# Patient Record
Sex: MALE | Race: WHITE | Employment: OTHER | URBAN - METROPOLITAN AREA
[De-identification: names, ages, dates, MRNs, and addresses within clinical notes are randomized per-mention and may not be internally consistent; named-entity substitution may affect disease eponyms.]

---

## 2021-03-30 ENCOUNTER — ANCILLARY PROCEDURE (OUTPATIENT)
Dept: GENERAL RADIOLOGY | Facility: CLINIC | Age: 81
End: 2021-03-30
Attending: PHYSICAL MEDICINE & REHABILITATION
Payer: MEDICARE

## 2021-03-30 ENCOUNTER — OFFICE VISIT (OUTPATIENT)
Dept: ORTHOPEDICS | Facility: CLINIC | Age: 81
End: 2021-03-30
Payer: MEDICARE

## 2021-03-30 VITALS — WEIGHT: 163 LBS | DIASTOLIC BLOOD PRESSURE: 80 MMHG | SYSTOLIC BLOOD PRESSURE: 128 MMHG

## 2021-03-30 DIAGNOSIS — M54.50 LOWER BACK PAIN: ICD-10-CM

## 2021-03-30 DIAGNOSIS — M25.551 RIGHT HIP PAIN: ICD-10-CM

## 2021-03-30 DIAGNOSIS — M54.41 ACUTE RIGHT-SIDED LOW BACK PAIN WITH RIGHT-SIDED SCIATICA: Primary | ICD-10-CM

## 2021-03-30 PROCEDURE — 99204 OFFICE O/P NEW MOD 45 MIN: CPT | Performed by: PHYSICAL MEDICINE & REHABILITATION

## 2021-03-30 PROCEDURE — 72100 X-RAY EXAM L-S SPINE 2/3 VWS: CPT | Mod: TC | Performed by: RADIOLOGY

## 2021-03-30 PROCEDURE — 73502 X-RAY EXAM HIP UNI 2-3 VIEWS: CPT | Mod: TC | Performed by: RADIOLOGY

## 2021-03-30 RX ORDER — METHYLPREDNISOLONE 4 MG
TABLET, DOSE PACK ORAL
Qty: 21 TABLET | Refills: 0 | Status: SHIPPED | OUTPATIENT
Start: 2021-03-30 | End: 2021-04-23

## 2021-03-30 NOTE — PROGRESS NOTES
"Sports Medicine Clinic Visit    PCP: No primary care provider on file.    CC: Patient presents with:  Lower Back - Pain      HPI:  Robby Yanez is a 80 year old male who is seen as a self referral.   He notes right lower back pain that travels into the hip, back of thigh and down back of lower leg.  Started 2-3 weeks ago, but this morning when he woke up it was much worse.  He rates the pain at a 10/10 at its worst and a 3/10 currently if sits in a certain position. Pain is described as a burning pain.  Symptoms are minimally relieved with Tylenol. Symptoms are worsened by walking and sit to stand transition.  He denies popping, numbness and tingling.  Other treatment has included Tylenol. About 2-3 years ago in [place], he had lower back surgery and patient notes that they had \"cleaned the bones up.\"  Bowls without difficulty and does PT exercises regularly.      He is retired.    History reviewed. No pertinent past surgical/medical/family/social history other than as mentioned in HPI.  Review of systems negative except per HPI.      Patient Active Problem List   Diagnosis   (none) - all problems resolved or deleted       Social History     Socioeconomic History     Marital status: Single     Spouse name: Not on file     Number of children: Not on file     Years of education: Not on file     Highest education level: Not on file   Occupational History     Not on file   Social Needs     Financial resource strain: Not on file     Food insecurity     Worry: Not on file     Inability: Not on file     Transportation needs     Medical: Not on file     Non-medical: Not on file   Tobacco Use     Smoking status: Not on file   Substance and Sexual Activity     Alcohol use: Not on file     Drug use: Not on file     Sexual activity: Not on file   Lifestyle     Physical activity     Days per week: Not on file     Minutes per session: Not on file     Stress: Not on file   Relationships     Social connections     Talks on " phone: Not on file     Gets together: Not on file     Attends Congregational service: Not on file     Active member of club or organization: Not on file     Attends meetings of clubs or organizations: Not on file     Relationship status: Not on file     Intimate partner violence     Fear of current or ex partner: Not on file     Emotionally abused: Not on file     Physically abused: Not on file     Forced sexual activity: Not on file   Other Topics Concern     Not on file   Social History Narrative     Not on file         Current Outpatient Medications   Medication     methylPREDNISolone (MEDROL DOSEPAK) 4 MG tablet therapy pack     No current facility-administered medications for this visit.      Allergies   Allergen Reactions     Penicillins          Objective:  /80 (BP Location: Right arm, Patient Position: Sitting, Cuff Size: Adult Regular)   Wt 73.9 kg (163 lb)     General: Alert and in no distress    Head: Normocephalic, atraumatic  Eyes: no scleral icterus or conjunctival erythema   Skin: no erythema, petechiae, or jaundice  CV: regular rhythm by palpation, 2+ distal pulses  Resp: normal respiratory effort without conversational dyspnea   Psych: normal mood and affect    Gait: Antalgic  Musculoskeletal:  -Lumbar surgical scar, well healed  -Tenderness to palpation over the right gluteal muscles  -Decreased and painful lumbar flexion, extension, and right lateral flexion  -Sensation intact to light touch over the lower extremities    Radiology:  X-rays ordered and independent visualization of images performed and reviewed with Robby.    Recent Results (from the past 744 hour(s))   XR Lumbar Spine 2-3 Views*    Narrative    LUMBAR SPINE TWO TO THREE VIEWS  3/30/2021 10:38 AM     HISTORY: Lower back pain.    COMPARISON: None.    FINDINGS:  There are five non-rib bearing lumbar type vertebrae. There  is broad-based dextroconvex curvature of the lumbar spine centered at  L3. Disc height loss is seen throughout  the lumbar spine but is worst  from L2 through L5. This is asymmetrically worse on the left at L2-L3  and at L3-L4 and is symmetric and L4-L5. Spondylotic spurs are seen,  worse on the left and in the anterior aspects of the lumbar spine. No  acute fracture or malalignment is otherwise seen. Pedicles are intact.  Psoas margins are not well seen on this study. SI joints are  unremarkable. Visualized portions of the superior hips demonstrate  normal superior hip joint space. There is a nonspecific bowel gas  pattern with gas seen both in small and large bowel loops. There is  straightening of the normal lumbar lordosis.      Impression    IMPRESSION:    1. Multilevel degenerative disc and facet disease of the lumbar spine  from L2 through S1.  2. Broad-based dextroconvex curvature lumbar spine centered at L2-L3  could be partially degenerative in etiology.  3. No acute fracture or malalignment.  4. Mildly nonspecific bowel gas pattern as described above.  5. Further evaluation with MRI lumbar spine may be helpful, as  clinically indicated.   XR Pelvis w Hip RT 1 View    Narrative    XR PELVIS AND HIP RIGHT 1 VIEW 3/30/2021 10:38 AM     HISTORY: Right hip pain      Impression    IMPRESSION: Mild femoral head osteophytic spurring. Hip joint space  width is relatively well preserved.    SHERLEY MCKEON MD         Assessment:  1. Acute right-sided low back pain with right-sided sciatica        Plan:  Discussed the assessment with the patient and developed a plan together:  -Medrol dose pack prescribed.  -Patient's preferred over the counter medication as directed on packaging as needed for pain or soreness.  -Ice or heat 15-20 minutes as needed (Avoid sleeping on a heating pad or ice)  -Consider use of assistive device (walker, cane) for safety with ambulation.    -Continue home exercises.    -Also discussed MRI and epidural steroid injections    **Advised that if he develops worsening pain, loss of bowel or bladder  control, loss of sensation in the extremities, or weakness in the extremities, he should seek emergent medical treatment**    -Follow up as needed if symptoms fail to improve or worsen.  Please call with questions or concerns.      Anny Archer MD, CAQ Sports Medicine  [facility]

## 2021-03-30 NOTE — PATIENT INSTRUCTIONS
-Medrol dose pack prescribed.  -Patient's preferred over the counter medication as directed on packaging as needed for pain or soreness.  -Ice or heat 15-20 minutes as needed (Avoid sleeping on a heating pad or ice)  -Consider use of assistive device (walker, cane) for safety with ambulation.    -Continue home exercises.    -Also discussed MRI and epidural steroid injections    **If you develop worsening pain, loss of bowel or bladder control, loss of sensation in the extremities, or weakness in the extremities, please seek emergent medical treatment**    -Follow up as needed if symptoms fail to improve or worsen.  Please call with questions or concerns.

## 2021-03-30 NOTE — Clinical Note
"    3/30/2021         RE: Robby Yanez  [address] [address]        Dear Colleague,    Thank you for referring your patient, Robby Yanez, to the [facility] [place]. Please see a copy of my visit note below.    Sports Medicine Clinic Visit    PCP: No primary care provider on file.    CC: Patient presents with:  Lower Back - Pain      HPI:  Robby Yanez is a 80 year old male who is seen as a self referral.   He notes right lower back pain that travels into the hip, back of thigh and down back of lower leg.  Started 2-3 weeks ago, but this morning when he woke up it was much worse.  He rates the pain at a 10/10 at its worst and a 3/10 currently if sits in a certain position. Pain is described as a burning pain.  Symptoms are minimally relieved with Tylenol. Symptoms are worsened by walking and sit to stand transition.  He denies popping, numbness and tingling.  Other treatment has included Tylenol. About 2-3 years ago in [place], he had lower back surgery and patient notes that they had \"cleaned the bones up.\"  Bowls without difficulty and does PT exercises regularly.      He is retired.    History reviewed. No pertinent past surgical/medical/family/social history other than as mentioned in HPI.  Review of systems negative except per HPI.      Patient Active Problem List   Diagnosis   (none) - all problems resolved or deleted       Social History     Socioeconomic History     Marital status: Single     Spouse name: Not on file     Number of children: Not on file     Years of education: Not on file     Highest education level: Not on file   Occupational History     Not on file   Social Needs     Financial resource strain: Not on file     Food insecurity     Worry: Not on file     Inability: Not on file     Transportation needs     Medical: Not on file     Non-medical: Not on file   Tobacco Use     Smoking status: Not on file   Substance and Sexual " Activity     Alcohol use: Not on file     Drug use: Not on file     Sexual activity: Not on file   Lifestyle     Physical activity     Days per week: Not on file     Minutes per session: Not on file     Stress: Not on file   Relationships     Social connections     Talks on phone: Not on file     Gets together: Not on file     Attends Christian service: Not on file     Active member of club or organization: Not on file     Attends meetings of clubs or organizations: Not on file     Relationship status: Not on file     Intimate partner violence     Fear of current or ex partner: Not on file     Emotionally abused: Not on file     Physically abused: Not on file     Forced sexual activity: Not on file   Other Topics Concern     Not on file   Social History Narrative     Not on file         Current Outpatient Medications   Medication     methylPREDNISolone (MEDROL DOSEPAK) 4 MG tablet therapy pack     No current facility-administered medications for this visit.      Allergies   Allergen Reactions     Penicillins          Objective:  /80 (BP Location: Right arm, Patient Position: Sitting, Cuff Size: Adult Regular)   Wt 73.9 kg (163 lb)     General: Alert and in no distress    Head: Normocephalic, atraumatic  Eyes: no scleral icterus or conjunctival erythema   Skin: no erythema, petechiae, or jaundice  CV: regular rhythm by palpation, 2+ distal pulses  Resp: normal respiratory effort without conversational dyspnea   Psych: normal mood and affect    Gait: Antalgic  Musculoskeletal:  -Lumbar surgical scar, well healed  -Tenderness to palpation over the right gluteal muscles  -Decreased and painful lumbar flexion, extension, and right lateral flexion  -Sensation intact to light touch over the lower extremities    Radiology:  X-rays ordered and independent visualization of images performed and reviewed with Robby.    Recent Results (from the past 744 hour(s))   XR Lumbar Spine 2-3 Views*    Narrative    LUMBAR SPINE  TWO TO THREE VIEWS  3/30/2021 10:38 AM     HISTORY: Lower back pain.    COMPARISON: None.    FINDINGS:  There are five non-rib bearing lumbar type vertebrae. There  is broad-based dextroconvex curvature of the lumbar spine centered at  L3. Disc height loss is seen throughout the lumbar spine but is worst  from L2 through L5. This is asymmetrically worse on the left at L2-L3  and at L3-L4 and is symmetric and L4-L5. Spondylotic spurs are seen,  worse on the left and in the anterior aspects of the lumbar spine. No  acute fracture or malalignment is otherwise seen. Pedicles are intact.  Psoas margins are not well seen on this study. SI joints are  unremarkable. Visualized portions of the superior hips demonstrate  normal superior hip joint space. There is a nonspecific bowel gas  pattern with gas seen both in small and large bowel loops. There is  straightening of the normal lumbar lordosis.      Impression    IMPRESSION:    1. Multilevel degenerative disc and facet disease of the lumbar spine  from L2 through S1.  2. Broad-based dextroconvex curvature lumbar spine centered at L2-L3  could be partially degenerative in etiology.  3. No acute fracture or malalignment.  4. Mildly nonspecific bowel gas pattern as described above.  5. Further evaluation with MRI lumbar spine may be helpful, as  clinically indicated.   XR Pelvis w Hip RT 1 View    Narrative    XR PELVIS AND HIP RIGHT 1 VIEW 3/30/2021 10:38 AM     HISTORY: Right hip pain      Impression    IMPRESSION: Mild femoral head osteophytic spurring. Hip joint space  width is relatively well preserved.    SHERLEY MCKEON MD         Assessment:  1. Acute right-sided low back pain with right-sided sciatica        Plan:  Discussed the assessment with the patient and developed a plan together:  -Medrol dose pack prescribed.  -Patient's preferred over the counter medication as directed on packaging as needed for pain or soreness.  -Ice or heat 15-20 minutes as needed (Avoid  sleeping on a heating pad or ice)  -Consider use of assistive device (walker, cane) for safety with ambulation.    -Continue home exercises.    -Also discussed MRI and epidural steroid injections    **Advised that if he develops worsening pain, loss of bowel or bladder control, loss of sensation in the extremities, or weakness in the extremities, he should seek emergent medical treatment**    -Follow up as needed if symptoms fail to improve or worsen.  Please call with questions or concerns.      Anny Archer MD, [facility] Sports Medicine  [facility]        Again, thank you for allowing me to participate in the care of your patient.        Sincerely,        Catherine Archer MD

## 2021-04-05 NOTE — PROGRESS NOTES
[facility] Medicine Clinic Visit       PCP: No Ref-Primary, Physician    Robby Yanez is a 80 year old male who is seen in follow up for right hip and lumbar back pain. Since last visit on 3/30/2021, Robby has noted that his pinching back pain has been relieved. He still notes right hip pain that radiates down the back of his leg into his ankle. He rates the pain at a 5/10 currently. Symptoms are present in all positions. Notes has been sleeping on the carpeted floor as of late. Says it is the most comfortable. Has finished his Medrol Pack.    He is retired.      History reviewed. No pertinent past surgical/medical/family/social history other than as mentioned in HPI.    Patient Active Problem List   Diagnosis   (none) - all problems resolved or deleted     No past medical history on file.  No past surgical history on file.  No family history on file.  Social History     Socioeconomic History     Marital status: Single     Spouse name: Not on file     Number of children: Not on file     Years of education: Not on file     Highest education level: Not on file   Occupational History     Not on file   Social Needs     Financial resource strain: Not on file     Food insecurity     Worry: Not on file     Inability: Not on file     Transportation needs     Medical: Not on file     Non-medical: Not on file   Tobacco Use     Smoking status: Not on file   Substance and Sexual Activity     Alcohol use: Not on file     Drug use: Not on file     Sexual activity: Not on file   Lifestyle     Physical activity     Days per week: Not on file     Minutes per session: Not on file     Stress: Not on file   Relationships     Social connections     Talks on phone: Not on file     Gets together: Not on file     Attends Pentec[employer]alism service: Not on file     Active member of club or organization: Not on file     Attends meetings of clubs or organizations: Not on file     Relationship status: Not on file     Intimate partner violence      "Fear of current or ex partner: Not on file     Emotionally abused: Not on file     Physically abused: Not on file     Forced sexual activity: Not on file   Other Topics Concern     Not on file   Social History Narrative     Not on file         Current Outpatient Medications   Medication     methylPREDNISolone (MEDROL DOSEPAK) 4 MG tablet therapy pack     No current facility-administered medications for this visit.      Allergies   Allergen Reactions     Penicillins          Objective:  /72 (BP Location: Right arm, Patient Position: Sitting, Cuff Size: Adult Regular)   Ht 1.727 m (5' 8\")   Wt 73.9 kg (163 lb)   BMI 24.78 kg/m      General: Alert and in no distress    Head: Normocephalic, atraumatic  Eyes: no scleral icterus or conjunctival erythema   Skin: no erythema, petechiae, or jaundice  Resp: normal respiratory effort without conversational dyspnea   Psych: normal mood and affect    Gait: Cautious gait    Musculoskeletal:  -No tenderness to palpation over the low back or right leg  -Altered sensation to light touch over the right anterolateral lower leg    Radiology:    XR PELVIS AND HIP RIGHT 1 VIEW 3/30/2021 10:38 AM      HISTORY: Right hip pain                                                                      IMPRESSION: Mild femoral head osteophytic spurring. Hip joint space  width is relatively well preserved.     SHERLEY MCKEON MD     LUMBAR SPINE TWO TO THREE VIEWS  3/30/2021 10:38 AM      HISTORY: Lower back pain.     COMPARISON: None.     FINDINGS:  There are five non-rib bearing lumbar type vertebrae. There  is broad-based dextroconvex curvature of the lumbar spine centered at  L3. Disc height loss is seen throughout the lumbar spine but is worst  from L2 through L5. This is asymmetrically worse on the left at L2-L3  and at L3-L4 and is symmetric and L4-L5. Spondylotic spurs are seen,  worse on the left and in the anterior aspects of the lumbar spine. No  acute fracture or malalignment is " otherwise seen. Pedicles are intact.  Psoas margins are not well seen on this study. SI joints are  unremarkable. Visualized portions of the superior hips demonstrate  normal superior hip joint space. There is a nonspecific bowel gas  pattern with gas seen both in small and large bowel loops. There is  straightening of the normal lumbar lordosis.                                                                      IMPRESSION:    1. Multilevel degenerative disc and facet disease of the lumbar spine  from L2 through S1.  2. Broad-based dextroconvex curvature lumbar spine centered at L2-L3  could be partially degenerative in etiology.  3. No acute fracture or malalignment.  4. Mildly nonspecific bowel gas pattern as described above.  5. Further evaluation with MRI lumbar spine may be helpful, as  clinically indicated.     JOSÉ MIGUEL MCKINLEY MD    Assessment:  1. Acute right-sided low back pain with right-sided sciatica        Plan:  Discussed the assessment with the patient and developed a plan together:  -MRI lumbar spine scheduled for 2:15 pm 4/7/2021 at [facility].    -Continue home exercises.    -Ice or heat 15-20 minutes as needed (Avoid sleeping on a heating pad or ice).  -Patient's preferred over the counter medication as directed on packaging as needed for pain or soreness.    -Also discussed lumbar epidural steroid injection    -Follow up with Dr. Archer on 4/8/2021 at 11:00 am at [facility].  Please call with questions or concerns.        Anny Archer MD, [facility] Sports Medicine  [facility]

## 2021-04-06 ENCOUNTER — OFFICE VISIT (OUTPATIENT)
Dept: ORTHOPEDICS | Facility: CLINIC | Age: 81
End: 2021-04-06
Payer: MEDICARE

## 2021-04-06 VITALS
DIASTOLIC BLOOD PRESSURE: 72 MMHG | WEIGHT: 163 LBS | HEIGHT: 68 IN | BODY MASS INDEX: 24.71 KG/M2 | SYSTOLIC BLOOD PRESSURE: 106 MMHG

## 2021-04-06 DIAGNOSIS — M54.41 ACUTE RIGHT-SIDED LOW BACK PAIN WITH RIGHT-SIDED SCIATICA: Primary | ICD-10-CM

## 2021-04-06 PROCEDURE — 99214 OFFICE O/P EST MOD 30 MIN: CPT | Performed by: PHYSICAL MEDICINE & REHABILITATION

## 2021-04-06 ASSESSMENT — MIFFLIN-ST. JEOR: SCORE: 1413.86

## 2021-04-06 NOTE — LETTER
Haris Sweeney   1/13/2017 10:30 AM   Office Visit    Dept Phone:  [phone]   Encounter #:  [id number]    Provider:  EVELINE Lazcano   Department:  [facility] INTERNAL MEDICINE                Your Full Care Plan              Today's Medication Changes          These changes are accurate as of: 1/13/17 11:12 AM.  If you have any questions, ask your nurse or doctor.               New Medication(s)Ordered:     cetirizine 5 MG tablet   Commonly known as:  zyrTEC   Take 1 tablet by mouth Daily.       Dextromethorphan-Guaifenesin  MG capsule   Take  mg by mouth Every 4 (Four) Hours As Needed (congestion and cough).       fluticasone 50 MCG/ACT nasal spray   Commonly known as:  FLONASE   2 sprays into each nostril Daily for 30 days.         Stop taking medication(s)listed here:     fluticasone 27.5 MCG/SPRAY nasal spray   Commonly known as:  VERAMYST                Where to Get Your Medications      These medications were sent to [facility]/pharmacy #[id number] - [place], [place] - [address]  [facility] [phone] [facility] [phone]   [address], [place] [address]    Hours:  24-hours Phone:  [phone]     cetirizine 5 MG tablet    Dextromethorphan-Guaifenesin  MG capsule    fluticasone 50 MCG/ACT nasal spray                  Your Updated Medication List          This list is accurate as of: 1/13/17 11:12 AM.  Always use your most recent med list.                allopurinol 100 MG tablet   Commonly known as:  ZYLOPRIM   Take 1 tablet by mouth 2 (two) times a day.       carvedilol 6.25 MG tablet   Commonly known as:  COREG       cetirizine 5 MG tablet   Commonly known as:  zyrTEC   Take 1 tablet by mouth Daily.       citalopram 10 MG tablet   Commonly known as:  CeleXA   Take 1 tablet by mouth daily. TAKE 1 TABLET BY MOUTH DAILY       Dextromethorphan-Guaifenesin  MG capsule   Take  mg by mouth Every 4 (Four) Hours As Needed      4/6/2021         RE: Robby Yanez  [address] [facility] [address]        Dear Colleague,    Thank you for referring your patient, Robby Yanez, to the [facility] [place]. Please see a copy of my visit note below.    [facility] Visit       PCP: No Ref-Primary, Physician    Robby Yanez is a 80 year old male who is seen in follow up for right hip and lumbar back pain. Since last visit on 3/30/2021, Robby has noted that his pinching back pain has been relieved. He still notes right hip pain that radiates down the back of his leg into his ankle. He rates the pain at a 5/10 currently. Symptoms are present in all positions. Notes has been sleeping on the carpeted floor as of late. Says it is the most comfortable. Has finished his Medrol Pack.    He is retired.      History reviewed. No pertinent past surgical/medical/family/social history other than as mentioned in HPI.    Patient Active Problem List   Diagnosis   (none) - all problems resolved or deleted     No past medical history on file.  No past surgical history on file.  No family history on file.  Social History     Socioeconomic History     Marital status: Single     Spouse name: Not on file     Number of children: Not on file     Years of education: Not on file     Highest education level: Not on file   Occupational History     Not on file   Social Needs     Financial resource strain: Not on file     Food insecurity     Worry: Not on file     Inability: Not on file     Transportation needs     Medical: Not on file     Non-medical: Not on file   Tobacco Use     Smoking status: Not on file   Substance and Sexual Activity     Alcohol use: Not on file     Drug use: Not on file     Sexual activity: Not on file   Lifestyle     Physical activity     Days per week: Not on file     Minutes per session: Not on file     Stress: Not on file   Relationships     Social connections     Talks on phone: Not  "on file     Gets together: Not on file     Attends Latter day service: Not on file     Active member of club or organization: Not on file     Attends meetings of clubs or organizations: Not on file     Relationship status: Not on file     Intimate partner violence     Fear of current or ex partner: Not on file     Emotionally abused: Not on file     Physically abused: Not on file     Forced sexual activity: Not on file   Other Topics Concern     Not on file   Social History Narrative     Not on file         Current Outpatient Medications   Medication     methylPREDNISolone (MEDROL DOSEPAK) 4 MG tablet therapy pack     No current facility-administered medications for this visit.      Allergies   Allergen Reactions     Penicillins          Objective:  /72 (BP Location: Right arm, Patient Position: Sitting, Cuff Size: Adult Regular)   Ht 1.727 m (5' 8\")   Wt 73.9 kg (163 lb)   BMI 24.78 kg/m      General: Alert and in no distress    Head: Normocephalic, atraumatic  Eyes: no scleral icterus or conjunctival erythema   Skin: no erythema, petechiae, or jaundice  Resp: normal respiratory effort without conversational dyspnea   Psych: normal mood and affect    Gait: Cautious gait    Musculoskeletal:  -No tenderness to palpation over the low back or right leg  -Altered sensation to light touch over the right anterolateral lower leg    Radiology:    XR PELVIS AND HIP RIGHT 1 VIEW 3/30/2021 10:38 AM      HISTORY: Right hip pain                                                                      IMPRESSION: Mild femoral head osteophytic spurring. Hip joint space  width is relatively well preserved.     SHERLEY MCKEON MD     LUMBAR SPINE TWO TO THREE VIEWS  3/30/2021 10:38 AM      HISTORY: Lower back pain.     COMPARISON: None.     FINDINGS:  There are five non-rib bearing lumbar type vertebrae. There  is broad-based dextroconvex curvature of the lumbar spine centered at  L3. Disc height loss is seen throughout the " (congestion and cough).       docusate sodium 100 MG capsule   Commonly known as:  COLACE       finasteride 5 MG tablet   Commonly known as:  PROSCAR       fluticasone 50 MCG/ACT nasal spray   Commonly known as:  FLONASE   2 sprays into each nostril Daily for 30 days.       furosemide 40 MG tablet   Commonly known as:  LASIX   TAKE 1 TABLET BY MOUTH 2 (TWO) TIMES A DAY.       glimepiride 2 MG tablet   Commonly known as:  AMARYL       glucose blood test strip   Test glucose daily DX E11.9       levothyroxine 100 MCG tablet   Commonly known as:  SYNTHROID, LEVOTHROID   Take 1 tablet by mouth daily.       metFORMIN 500 MG tablet   Commonly known as:  GLUCOPHAGE   TAKE 1 TABLET BY MOUTH TWICE A DAY       ONE TOUCH LANCETS misc   Test glucose daily dx e11.9       ONE TOUCH ULTRA 2 W/DEVICE kit   Test glucose daily       pantoprazole 40 MG EC tablet   Commonly known as:  PROTONIX       potassium chloride 10 MEQ CR tablet   Commonly known as:  K-DUR       Saw Palmetto 450 MG capsule       valsartan 40 MG tablet   Commonly known as:  DIOVAN   Take 1 tablet by mouth Daily.       VITAMIN B COMPLEX PO       warfarin 2.5 MG tablet   Commonly known as:  COUMADIN               Instructions     None    Patient Instructions History      Goals     Eat more fruits and vegetables     I have stressed to him the need for proper healthy diet low carbohydrate with more vegetables and high-fiber        Upcoming Appointments     Visit Type Date Time Department    OFFICE VISIT 1/13/2017 10:30 AM [facility] ALEXIS 1 4003    OFFICE VISIT 1/26/2017  1:45 PM [facility] TRACIJ YOLANDA    OFFICE VISIT 4/5/2017  1:30 PM [facility] CARDOZAE 1 4003      Udexhart Signup     Our records indicate that you have declined voxapphart signup. If you would like to sign up for Showpad, please email [email or web address] or call [phone] to obtain an activation code.             Other Info from Your Visit           Your Appointments     Jan 26, 2017  1:45 PM EST    Office Visit with Ashley Mcclellan MD   [facility] (--)    [address] [facility] [address]  [facility] [id number] [phone]           Arrive 15 minutes prior to appointment.            Apr 05, 2017  1:30 PM EDT   Office Visit with Srikanth Madsen MD   [facility] INTERNAL MEDICINE (--)    [address] [facility]  [facility] [address] [phone]           Arrive 15 minutes prior to appointment.              Allergies     Digoxin And Related      Keflex [Cephalexin]        Reason for Visit     URI     Cough     Fatigue           Vital Signs     Blood Pressure Pulse Temperature Respirations Weight Oxygen Saturation    118/68 (BP Location: Right arm, Patient Position: Sitting, Cuff Size: Small Adult) 76 97.4 °F (36.3 °C) (Tympanic) 16 155 lb (70.3 kg) 98%    Body Mass Index Smoking Status                26.61 kg/m2 Former Smoker             lumbar spine but is worst  from L2 through L5. This is asymmetrically worse on the left at L2-L3  and at L3-L4 and is symmetric and L4-L5. Spondylotic spurs are seen,  worse on the left and in the anterior aspects of the lumbar spine. No  acute fracture or malalignment is otherwise seen. Pedicles are intact.  Psoas margins are not well seen on this study. SI joints are  unremarkable. Visualized portions of the superior hips demonstrate  normal superior hip joint space. There is a nonspecific bowel gas  pattern with gas seen both in small and large bowel loops. There is  straightening of the normal lumbar lordosis.                                                                      IMPRESSION:    1. Multilevel degenerative disc and facet disease of the lumbar spine  from L2 through S1.  2. Broad-based dextroconvex curvature lumbar spine centered at L2-L3  could be partially degenerative in etiology.  3. No acute fracture or malalignment.  4. Mildly nonspecific bowel gas pattern as described above.  5. Further evaluation with MRI lumbar spine may be helpful, as  clinically indicated.     JOSÉ MIGUEL MCKINLEY MD    Assessment:  1. Acute right-sided low back pain with right-sided sciatica        Plan:  Discussed the assessment with the patient and developed a plan together:  -MRI lumbar spine scheduled for 2:15 pm 4/7/2021 at [facility].    -Continue home exercises.    -Ice or heat 15-20 minutes as needed (Avoid sleeping on a heating pad or ice).  -Patient's preferred over the counter medication as directed on packaging as needed for pain or soreness.    -Also discussed lumbar epidural steroid injection    -Follow up with Dr. Archer on 4/8/2021 at 11:00 am at [facility].  Please call with questions or concerns.        Anny Archer MD, [facility] Sports Medicine  [facility]          Again, thank you for allowing me to participate in the care of your patient.        Sincerely,        Catherine  PARUL Archer MD

## 2021-04-06 NOTE — PATIENT INSTRUCTIONS
-MRI lumbar spine scheduled for 2:15 pm 4/7/2021 at [facility].  Please check in on the main floor at registration.  -Continue home exercises.    -Ice or heat 15-20 minutes as needed (Avoid sleeping on a heating pad or ice).  -Patient's preferred over the counter medication as directed on packaging as needed for pain or soreness.    -Also discussed lumbar epidural steroid injection    -Follow up with Dr. Archer on 4/8/2021 at 11:00 am at [facility].  Please call with questions or concerns.

## 2021-04-06 NOTE — PROGRESS NOTES
Sports Medicine Clinic Visit       PCP: No Ref-Primary, Physician     Robby Yanez is a 80 year old male who is seen in follow up for right hip and lumbar back pain. Since last visit on 4/6/2021, Robby has noted that his pinching back pain has returned. He still notes right hip pain that radiates down the back of his leg into his ankle. He rates the pain at a 5/10 currently. Symptoms are present in all positions. Sleeping in a recliner for pain relief. Says it is the most comfortable. Took a few Tylenol last night for pain.     He is retired.       History reviewed. No pertinent past surgical/medical/family/social history other than as mentioned in HPI.    Patient Active Problem List   Diagnosis   (none) - all problems resolved or deleted       Social History     Socioeconomic History     Marital status: Single     Spouse name: Not on file     Number of children: Not on file     Years of education: Not on file     Highest education level: Not on file   Occupational History     Not on file   Social Needs     Financial resource strain: Not on file     Food insecurity     Worry: Not on file     Inability: Not on file     Transportation needs     Medical: Not on file     Non-medical: Not on file   Tobacco Use     Smoking status: Not on file   Substance and Sexual Activity     Alcohol use: Not on file     Drug use: Not on file     Sexual activity: Not on file   Lifestyle     Physical activity     Days per week: Not on file     Minutes per session: Not on file     Stress: Not on file   Relationships     Social connections     Talks on phone: Not on file     Gets together: Not on file     Attends Sikhism service: Not on file     Active member of club or organization: Not on file     Attends meetings of clubs or organizations: Not on file     Relationship status: Not on file     Intimate partner violence     Fear of current or ex partner: Not on file     Emotionally abused: Not on file     Physically abused: Not on  "file     Forced sexual activity: Not on file   Other Topics Concern     Not on file   Social History Narrative     Not on file         Current Outpatient Medications   Medication     methylPREDNISolone (MEDROL DOSEPAK) 4 MG tablet therapy pack     No current facility-administered medications for this visit.      Allergies   Allergen Reactions     Penicillins          Objective:  /78 (BP Location: Right arm, Patient Position: Sitting, Cuff Size: Adult Regular)   Ht 1.727 m (5' 8\")   Wt 73.9 kg (163 lb)   BMI 24.78 kg/m      General: Alert and in no distress    Head: Normocephalic, atraumatic  Eyes: no scleral icterus or conjunctival erythema   Skin: no erythema, petechiae, or jaundice  Resp: normal respiratory effort without conversational dyspnea   Psych: normal mood and affect    Gait: Antalgic    Radiology:  Independent visualization of images performed and reviewed with Robby.    MRI LUMBAR SPINE WITH AND WITHOUT CONTRAST   4/7/2021 3:40 PM      HISTORY: Low back pain, prior surgery, new symptoms. Acute right-sided  low back pain with right-sided sciatica.     TECHNIQUE: Multiplanar multisequence MRI of the lumbar spine with and  without contrast. A total of 7.5 mL of Gadavist was injected  intravenously.     COMPARISON: X-rays 3/30/2021      FINDINGS:  Alignment is significant for dextroconvex curvature. Bone  marrow demonstrates scattered degenerative endplate change with marrow  edema, most conspicuous at L4-L5 and L5-S1. Conus medullaris and cauda  equina are unremarkable. Conus medullaris terminates at the level of  the mid L2 vertebral body. Presumed parapelvic renal cysts and 3 mm  lesion within the medial cortex of right kidney which is incompletely  characterized, statistically likely benign cyst.     Segmental Analysis:      T12-L1:  Mild disc height loss. No herniation. Mild bilateral facet  arthropathy. No foraminal or spinal canal stenosis.      L1-L2:  Mild disc height loss. Disc bulge, " asymmetric to the right.  Mild bilateral facet arthropathy. Mild bilateral foraminal stenosis,  right worse than left. Mild spinal canal stenosis.       L2-L3:  Laminectomy change. Severe disc height loss. Disc bulge with  shallow left central protrusion component. Mild bilateral facet  arthropathy. Mild to moderate bilateral foraminal stenosis. No spinal  canal stenosis.     L3-L4:  Laminectomy change. Severe disc height loss. Disc bulge with  left central protrusion component which probably contacts the  traversing left L4 nerve root within the lateral recess. Mild  bilateral facet arthropathy. Moderate bilateral foraminal stenosis,  left worse than right. No spinal canal stenosis.     L4-L5:  Laminectomy change. Severe disc height loss. Disc bulge,  asymmetric to the left. Mild bilateral facet arthropathy. Moderate  right foraminal stenosis. Severe left foraminal stenosis. No spinal  canal stenosis.       L5-S1:  Laminectomy change. Moderate disc height loss. Disc bulge,  asymmetric to the right. Enhancement along the right aspect of the  disc within the paraspinal soft tissues, presumably degenerative.  Moderate right facet arthropathy with a 6 mm synovial cyst projecting  anteriorly off the right facet joint which contributes to lateral  recess and foraminal stenosis. Mild left facet arthropathy. Severe  right foraminal stenosis. Moderate left foraminal stenosis. No spinal  canal stenosis.                                                                      IMPRESSION:    1. Postoperative change of L2-L3 through L5-S1 laminectomies.  2. 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  3. Other degenerative change as detailed.     JONATHON BLAIR MD    Assessment:  1. Acute right-sided low back pain with right-sided sciatica    2. Lumbar degenerative disc disease    3. Right lumbar radiculopathy    4. History of spinal surgery        Plan:  Discussed the assessment with the patient and developed a plan  together:  -Lumbar epidural steroid injection ordered with pain management at [facility].   -Continue home exercises.    -Ice or heat 15-20 minutes as needed (Avoid sleeping on a heating pad or ice).  -Patient's preferred over the counter medication as directed on packaging as needed for pain or soreness.    -Also discussed spine surgery referral     -Follow up as needed if symptoms fail to improve or worsen.  Please call with questions or concerns.      Anny Archer MD, [facility] Sports Medicine  [facility]

## 2021-04-07 ENCOUNTER — HOSPITAL ENCOUNTER (OUTPATIENT)
Dept: MRI IMAGING | Facility: CLINIC | Age: 81
Discharge: HOME | End: 2021-04-07
Attending: PHYSICAL MEDICINE & REHABILITATION | Admitting: PHYSICAL MEDICINE & REHABILITATION
Payer: MEDICARE

## 2021-04-07 DIAGNOSIS — M54.41 ACUTE RIGHT-SIDED LOW BACK PAIN WITH RIGHT-SIDED SCIATICA: ICD-10-CM

## 2021-04-07 LAB
CREAT BLD-MCNC: 1.2 MG/DL (ref 0.66–1.25)
GFR SERPL CREATININE-BSD FRML MDRD: 58 ML/MIN/{1.73_M2}

## 2021-04-07 PROCEDURE — 255N000002 HC RX 255 OP 636: Performed by: PHYSICAL MEDICINE & REHABILITATION

## 2021-04-07 PROCEDURE — A9585 GADOBUTROL INJECTION: HCPCS | Performed by: PHYSICAL MEDICINE & REHABILITATION

## 2021-04-07 PROCEDURE — 72158 MRI LUMBAR SPINE W/O & W/DYE: CPT | Mod: ME

## 2021-04-07 PROCEDURE — 82565 ASSAY OF CREATININE: CPT

## 2021-04-07 RX ORDER — GADOBUTROL 604.72 MG/ML
7.5 INJECTION INTRAVENOUS ONCE
Status: COMPLETED | OUTPATIENT
Start: 2021-04-07 | End: 2021-04-07

## 2021-04-07 RX ADMIN — GADOBUTROL 7.5 ML: 604.72 INJECTION INTRAVENOUS at 15:39

## 2021-04-08 ENCOUNTER — OFFICE VISIT (OUTPATIENT)
Dept: ORTHOPEDICS | Facility: CLINIC | Age: 81
End: 2021-04-08
Payer: MEDICARE

## 2021-04-08 VITALS
BODY MASS INDEX: 24.71 KG/M2 | SYSTOLIC BLOOD PRESSURE: 122 MMHG | HEIGHT: 68 IN | WEIGHT: 163 LBS | DIASTOLIC BLOOD PRESSURE: 78 MMHG

## 2021-04-08 DIAGNOSIS — M51.369 LUMBAR DEGENERATIVE DISC DISEASE: ICD-10-CM

## 2021-04-08 DIAGNOSIS — M54.41 ACUTE RIGHT-SIDED LOW BACK PAIN WITH RIGHT-SIDED SCIATICA: Primary | ICD-10-CM

## 2021-04-08 DIAGNOSIS — Z98.890 HISTORY OF SPINAL SURGERY: ICD-10-CM

## 2021-04-08 DIAGNOSIS — M54.16 RIGHT LUMBAR RADICULOPATHY: ICD-10-CM

## 2021-04-08 PROCEDURE — 99213 OFFICE O/P EST LOW 20 MIN: CPT | Performed by: PHYSICAL MEDICINE & REHABILITATION

## 2021-04-08 ASSESSMENT — MIFFLIN-ST. JEOR: SCORE: 1413.86

## 2021-04-08 NOTE — PATIENT INSTRUCTIONS
-Lumbar epidural steroid injection ordered with pain management at [facility].   -Continue home exercises.    -Ice or heat 15-20 minutes as needed (Avoid sleeping on a heating pad or ice).  -Patient's preferred over the counter medication as directed on packaging as needed for pain or soreness.    -Also discussed spine surgery referral     -Follow up as needed if symptoms fail to improve or worsen.  Please call with questions or concerns.

## 2021-04-08 NOTE — LETTER
4/8/2021         RE: Robby Yanez  [address] [facility] [address]        Dear Colleague,    Thank you for referring your patient, Robby Yanez, to the [facility] [place]. Please see a copy of my visit note below.    Sports Medicine Clinic Visit       PCP: No Ref-Primary, Physician     Robby Yanez is a 80 year old male who is seen in follow up for right hip and lumbar back pain. Since last visit on 4/6/2021, Robby has noted that his pinching back pain has returned. He still notes right hip pain that radiates down the back of his leg into his ankle. He rates the pain at a 5/10 currently. Symptoms are present in all positions. Sleeping in a recliner for pain relief. Says it is the most comfortable. Took a few Tylenol last night for pain.     He is retired.       History reviewed. No pertinent past surgical/medical/family/social history other than as mentioned in HPI.    Patient Active Problem List   Diagnosis   (none) - all problems resolved or deleted       Social History     Socioeconomic History     Marital status: Single     Spouse name: Not on file     Number of children: Not on file     Years of education: Not on file     Highest education level: Not on file   Occupational History     Not on file   Social Needs     Financial resource strain: Not on file     Food insecurity     Worry: Not on file     Inability: Not on file     Transportation needs     Medical: Not on file     Non-medical: Not on file   Tobacco Use     Smoking status: Not on file   Substance and Sexual Activity     Alcohol use: Not on file     Drug use: Not on file     Sexual activity: Not on file   Lifestyle     Physical activity     Days per week: Not on file     Minutes per session: Not on file     Stress: Not on file   Relationships     Social connections     Talks on phone: Not on file     Gets together: Not on file     Attends Islam service: Not on file     Active member of club  "or organization: Not on file     Attends meetings of clubs or organizations: Not on file     Relationship status: Not on file     Intimate partner violence     Fear of current or ex partner: Not on file     Emotionally abused: Not on file     Physically abused: Not on file     Forced sexual activity: Not on file   Other Topics Concern     Not on file   Social History Narrative     Not on file         Current Outpatient Medications   Medication     methylPREDNISolone (MEDROL DOSEPAK) 4 MG tablet therapy pack     No current facility-administered medications for this visit.      Allergies   Allergen Reactions     Penicillins          Objective:  /78 (BP Location: Right arm, Patient Position: Sitting, Cuff Size: Adult Regular)   Ht 1.727 m (5' 8\")   Wt 73.9 kg (163 lb)   BMI 24.78 kg/m      General: Alert and in no distress    Head: Normocephalic, atraumatic  Eyes: no scleral icterus or conjunctival erythema   Skin: no erythema, petechiae, or jaundice  Resp: normal respiratory effort without conversational dyspnea   Psych: normal mood and affect    Gait: Antalgic    Radiology:  Independent visualization of images performed and reviewed with Robby.    MRI LUMBAR SPINE WITH AND WITHOUT CONTRAST   4/7/2021 3:40 PM      HISTORY: Low back pain, prior surgery, new symptoms. Acute right-sided  low back pain with right-sided sciatica.     TECHNIQUE: Multiplanar multisequence MRI of the lumbar spine with and  without contrast. A total of 7.5 mL of Gadavist was injected  intravenously.     COMPARISON: X-rays 3/30/2021      FINDINGS:  Alignment is significant for dextroconvex curvature. Bone  marrow demonstrates scattered degenerative endplate change with marrow  edema, most conspicuous at L4-L5 and L5-S1. Conus medullaris and cauda  equina are unremarkable. Conus medullaris terminates at the level of  the mid L2 vertebral body. Presumed parapelvic renal cysts and 3 mm  lesion within the medial cortex of right kidney " which is incompletely  characterized, statistically likely benign cyst.     Segmental Analysis:      T12-L1:  Mild disc height loss. No herniation. Mild bilateral facet  arthropathy. No foraminal or spinal canal stenosis.      L1-L2:  Mild disc height loss. Disc bulge, asymmetric to the right.  Mild bilateral facet arthropathy. Mild bilateral foraminal stenosis,  right worse than left. Mild spinal canal stenosis.       L2-L3:  Laminectomy change. Severe disc height loss. Disc bulge with  shallow left central protrusion component. Mild bilateral facet  arthropathy. Mild to moderate bilateral foraminal stenosis. No spinal  canal stenosis.     L3-L4:  Laminectomy change. Severe disc height loss. Disc bulge with  left central protrusion component which probably contacts the  traversing left L4 nerve root within the lateral recess. Mild  bilateral facet arthropathy. Moderate bilateral foraminal stenosis,  left worse than right. No spinal canal stenosis.     L4-L5:  Laminectomy change. Severe disc height loss. Disc bulge,  asymmetric to the left. Mild bilateral facet arthropathy. Moderate  right foraminal stenosis. Severe left foraminal stenosis. No spinal  canal stenosis.       L5-S1:  Laminectomy change. Moderate disc height loss. Disc bulge,  asymmetric to the right. Enhancement along the right aspect of the  disc within the paraspinal soft tissues, presumably degenerative.  Moderate right facet arthropathy with a 6 mm synovial cyst projecting  anteriorly off the right facet joint which contributes to lateral  recess and foraminal stenosis. Mild left facet arthropathy. Severe  right foraminal stenosis. Moderate left foraminal stenosis. No spinal  canal stenosis.                                                                      IMPRESSION:    1. Postoperative change of L2-L3 through L5-S1 laminectomies.  2. 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  3. Other degenerative change as detailed.     JONATHON BLAIR,  MD    Assessment:  1. Acute right-sided low back pain with right-sided sciatica    2. Lumbar degenerative disc disease    3. Right lumbar radiculopathy    4. History of spinal surgery        Plan:  Discussed the assessment with the patient and developed a plan together:  -Lumbar epidural steroid injection ordered with pain management at [facility].   -Continue home exercises.    -Ice or heat 15-20 minutes as needed (Avoid sleeping on a heating pad or ice).  -Patient's preferred over the counter medication as directed on packaging as needed for pain or soreness.    -Also discussed spine surgery referral     -Follow up as needed if symptoms fail to improve or worsen.  Please call with questions or concerns.      Anny Archer MD, [place] Sports Medicine  [facility]        JERARDO referral faxed to Dr. Moulton at [phone]. Vanesa Medellin ATC        Again, thank you for allowing me to participate in the care of your patient.        Sincerely,        Catherine Archer MD

## 2021-04-09 ENCOUNTER — TELEPHONE (OUTPATIENT)
Dept: PALLIATIVE MEDICINE | Facility: CLINIC | Age: 81
End: 2021-04-09

## 2021-04-09 NOTE — TELEPHONE ENCOUNTER
Pt scheduled for JERARDO   Date: 4/30/21  Time: 1330  Dr. Moulton    Instructed pt to have H&P and  for procedure.  Patient informed of COVID testing process.

## 2021-04-20 DIAGNOSIS — Z11.59 ENCOUNTER FOR SCREENING FOR OTHER VIRAL DISEASES: ICD-10-CM

## 2021-04-23 ENCOUNTER — OFFICE VISIT (OUTPATIENT)
Dept: INTERNAL MEDICINE | Facility: CLINIC | Age: 81
End: 2021-04-23
Payer: MEDICARE

## 2021-04-23 VITALS
TEMPERATURE: 97.6 F | OXYGEN SATURATION: 98 % | DIASTOLIC BLOOD PRESSURE: 80 MMHG | BODY MASS INDEX: 25.22 KG/M2 | WEIGHT: 160.7 LBS | HEIGHT: 67 IN | HEART RATE: 82 BPM | RESPIRATION RATE: 18 BRPM | SYSTOLIC BLOOD PRESSURE: 138 MMHG

## 2021-04-23 DIAGNOSIS — Z12.5 SCREENING FOR PROSTATE CANCER: ICD-10-CM

## 2021-04-23 DIAGNOSIS — Z98.890 STATUS POST LAMINECTOMY: ICD-10-CM

## 2021-04-23 DIAGNOSIS — M54.16 LUMBAR RADICULOPATHY: ICD-10-CM

## 2021-04-23 DIAGNOSIS — Z01.818 PREOP GENERAL PHYSICAL EXAM: Primary | ICD-10-CM

## 2021-04-23 DIAGNOSIS — M51.369 DDD (DEGENERATIVE DISC DISEASE), LUMBAR: ICD-10-CM

## 2021-04-23 DIAGNOSIS — E78.5 HYPERLIPIDEMIA LDL GOAL <100: ICD-10-CM

## 2021-04-23 LAB
ALBUMIN UR-MCNC: NEGATIVE MG/DL
ANION GAP SERPL CALCULATED.3IONS-SCNC: 4 MMOL/L (ref 3–14)
APPEARANCE UR: ABNORMAL
BASOPHILS # BLD AUTO: 0 10E9/L (ref 0–0.2)
BASOPHILS NFR BLD AUTO: 0.7 %
BILIRUB UR QL STRIP: NEGATIVE
BUN SERPL-MCNC: 17 MG/DL (ref 7–30)
CALCIUM SERPL-MCNC: 8.9 MG/DL (ref 8.5–10.1)
CHLORIDE SERPL-SCNC: 103 MMOL/L (ref 94–109)
CO2 SERPL-SCNC: 30 MMOL/L (ref 20–32)
COLOR UR AUTO: YELLOW
CREAT SERPL-MCNC: 1.18 MG/DL (ref 0.66–1.25)
DIFFERENTIAL METHOD BLD: ABNORMAL
EOSINOPHIL # BLD AUTO: 0.2 10E9/L (ref 0–0.7)
EOSINOPHIL NFR BLD AUTO: 3.5 %
ERYTHROCYTE [DISTWIDTH] IN BLOOD BY AUTOMATED COUNT: 12.7 % (ref 10–15)
GFR SERPL CREATININE-BSD FRML MDRD: 57 ML/MIN/{1.73_M2}
GLUCOSE SERPL-MCNC: 101 MG/DL (ref 70–99)
GLUCOSE UR STRIP-MCNC: NEGATIVE MG/DL
HCT VFR BLD AUTO: 43.3 % (ref 40–53)
HGB BLD-MCNC: 15 G/DL (ref 13.3–17.7)
HGB UR QL STRIP: NEGATIVE
IMM GRANULOCYTES # BLD: 0 10E9/L (ref 0–0.4)
IMM GRANULOCYTES NFR BLD: 0.2 %
KETONES UR STRIP-MCNC: NEGATIVE MG/DL
LEUKOCYTE ESTERASE UR QL STRIP: NEGATIVE
LYMPHOCYTES # BLD AUTO: 1.3 10E9/L (ref 0.8–5.3)
LYMPHOCYTES NFR BLD AUTO: 28.2 %
MCH RBC QN AUTO: 32.8 PG (ref 26.5–33)
MCHC RBC AUTO-ENTMCNC: 34.6 G/DL (ref 31.5–36.5)
MCV RBC AUTO: 95 FL (ref 78–100)
MONOCYTES # BLD AUTO: 0.3 10E9/L (ref 0–1.3)
MONOCYTES NFR BLD AUTO: 6.7 %
MUCOUS THREADS #/AREA URNS LPF: PRESENT /LPF
NEUTROPHILS # BLD AUTO: 2.7 10E9/L (ref 1.6–8.3)
NEUTROPHILS NFR BLD AUTO: 60.7 %
NITRATE UR QL: NEGATIVE
NRBC # BLD AUTO: 0 10*3/UL
NRBC BLD AUTO-RTO: 0 /100
PH UR STRIP: 6 PH (ref 5–7)
PLATELET # BLD AUTO: 144 10E9/L (ref 150–450)
POTASSIUM SERPL-SCNC: 4.3 MMOL/L (ref 3.4–5.3)
PSA SERPL-ACNC: 2.34 UG/L (ref 0–4)
RBC # BLD AUTO: 4.58 10E12/L (ref 4.4–5.9)
RBC #/AREA URNS AUTO: 2 /HPF (ref 0–2)
SODIUM SERPL-SCNC: 137 MMOL/L (ref 133–144)
SOURCE: ABNORMAL
SP GR UR STRIP: 1.02 (ref 1–1.03)
UROBILINOGEN UR STRIP-MCNC: 0 MG/DL (ref 0–2)
WBC # BLD AUTO: 4.5 10E9/L (ref 4–11)
WBC #/AREA URNS AUTO: 5 /HPF (ref 0–5)

## 2021-04-23 PROCEDURE — 36415 COLL VENOUS BLD VENIPUNCTURE: CPT | Performed by: INTERNAL MEDICINE

## 2021-04-23 PROCEDURE — 85025 COMPLETE CBC W/AUTO DIFF WBC: CPT | Performed by: INTERNAL MEDICINE

## 2021-04-23 PROCEDURE — 81001 URINALYSIS AUTO W/SCOPE: CPT | Performed by: INTERNAL MEDICINE

## 2021-04-23 PROCEDURE — 80048 BASIC METABOLIC PNL TOTAL CA: CPT | Performed by: INTERNAL MEDICINE

## 2021-04-23 PROCEDURE — 99214 OFFICE O/P EST MOD 30 MIN: CPT | Performed by: INTERNAL MEDICINE

## 2021-04-23 PROCEDURE — G0103 PSA SCREENING: HCPCS | Performed by: INTERNAL MEDICINE

## 2021-04-23 ASSESSMENT — PAIN SCALES - GENERAL: PAINLEVEL: MODERATE PAIN (5)

## 2021-04-23 ASSESSMENT — MIFFLIN-ST. JEOR: SCORE: 1387.56

## 2021-04-23 NOTE — PATIENT INSTRUCTIONS
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
## 2021-04-26 ENCOUNTER — TELEPHONE (OUTPATIENT)
Dept: INTERNAL MEDICINE | Facility: CLINIC | Age: 81
End: 2021-04-26

## 2021-04-26 NOTE — LETTER
April 26, 2021    Robby Yanez  [address] [facility] [address]    Dear ,    We are writing to inform you of your test results.    The urine sample was normal.   The prostate-specific antigen is consistent with a low risk of prostate cancer.   The chemistry panel is essentially normal.   The blood cell count is normal without evidence of anemia or leukemia.     Resulted Orders   CBC with platelets and differential   Result Value Ref Range    WBC 4.5 4.0 - 11.0 10e9/L    RBC Count 4.58 4.4 - 5.9 10e12/L    Hemoglobin 15.0 13.3 - 17.7 g/dL    Hematocrit 43.3 40.0 - 53.0 %    MCV 95 78 - 100 fl    MCH 32.8 26.5 - 33.0 pg    MCHC 34.6 31.5 - 36.5 g/dL    RDW 12.7 10.0 - 15.0 %    Platelet Count 144 (L) 150 - 450 10e9/L    Diff Method Automated Method     % Neutrophils 60.7 %    % Lymphocytes 28.2 %    % Monocytes 6.7 %    % Eosinophils 3.5 %    % Basophils 0.7 %    % Immature Granulocytes 0.2 %    Nucleated RBCs 0 0 /100    Absolute Neutrophil 2.7 1.6 - 8.3 10e9/L    Absolute Lymphocytes 1.3 0.8 - 5.3 10e9/L    Absolute Monocytes 0.3 0.0 - 1.3 10e9/L    Absolute Eosinophils 0.2 0.0 - 0.7 10e9/L    Absolute Basophils 0.0 0.0 - 0.2 10e9/L    Abs Immature Granulocytes 0.0 0 - 0.4 10e9/L    Absolute Nucleated RBC 0.0    Basic metabolic panel  (Ca, Cl, CO2, Creat, Gluc, K, Na, BUN)   Result Value Ref Range    Sodium 137 133 - 144 mmol/L    Potassium 4.3 3.4 - 5.3 mmol/L    Chloride 103 94 - 109 mmol/L    Carbon Dioxide 30 20 - 32 mmol/L    Anion Gap 4 3 - 14 mmol/L    Glucose 101 (H) 70 - 99 mg/dL    Urea Nitrogen 17 7 - 30 mg/dL    Creatinine 1.18 0.66 - 1.25 mg/dL    GFR Estimate 57 (L) >60 mL/min/[1.73_m2]      Comment:      Non  GFR Calc  Starting 12/18/2018, serum creatinine based estimated GFR (eGFR) will be   calculated using the Chronic Kidney Disease Epidemiology Collaboration   (CKD-EPI) equation.      GFR Estimate If Black 66 >60 mL/min/[1.73_m2]      Comment:        American GFR Calc  Starting 12/18/2018, serum creatinine based estimated GFR (eGFR) will be   calculated using the Chronic Kidney Disease Epidemiology Collaboration   (CKD-EPI) equation.      Calcium 8.9 8.5 - 10.1 mg/dL   *UA reflex to Microscopic and Culture ([place]; [facility]; [facility]; [facility]; [facility]; [facility]; [place]; [place])   Result Value Ref Range    Color Urine Yellow     Appearance Urine Slightly Cloudy     Glucose Urine Negative NEG^Negative mg/dL    Bilirubin Urine Negative NEG^Negative    Ketones Urine Negative NEG^Negative mg/dL    Specific Gravity Urine 1.017 1.003 - 1.035    Blood Urine Negative NEG^Negative    pH Urine 6.0 5.0 - 7.0 pH    Protein Albumin Urine Negative NEG^Negative mg/dL    Urobilinogen mg/dL 0.0 0.0 - 2.0 mg/dL    Nitrite Urine Negative NEG^Negative    Leukocyte Esterase Urine Negative NEG^Negative    Source Unspecified Urine     RBC Urine 2 0 - 2 /HPF    WBC Urine 5 0 - 5 /HPF    Mucous Urine Present (A) NEG^Negative /LPF   PSA, screen   Result Value Ref Range    PSA 2.34 0 - 4 ug/L      Comment:      Assay Method:  Chemiluminescence using Siemens Vista analyzer             You will be contacted with any outstanding results as they are available.   Feel free to contact me via the office or My Chart if you have any questions regarding the above.       Sincerely,         Charan Mackey DO

## 2021-04-26 NOTE — TELEPHONE ENCOUNTER
----- Message from Charan Mackey DO sent at 4/26/2021 11:33 AM CDT -----  Dear Robby, your recent test results are attached.    The urine sample was normal.  The prostate-specific antigen is consistent with a low risk of prostate cancer.  The chemistry panel is essentially normal.  The blood cell count is normal without evidence of anemia or leukemia.    You will be contacted with any outstanding results as they are available.  Feel free to contact me via the office or My Chart if you have any questions regarding the above.

## 2021-04-27 DIAGNOSIS — Z11.59 ENCOUNTER FOR SCREENING FOR OTHER VIRAL DISEASES: ICD-10-CM

## 2021-04-27 LAB
SARS-COV-2 RNA RESP QL NAA+PROBE: NORMAL
SPECIMEN SOURCE: NORMAL

## 2021-04-27 PROCEDURE — U0005 INFEC AGEN DETEC AMPLI PROBE: HCPCS | Performed by: ANESTHESIOLOGY

## 2021-04-27 PROCEDURE — U0003 INFECTIOUS AGENT DETECTION BY NUCLEIC ACID (DNA OR RNA); SEVERE ACUTE RESPIRATORY SYNDROME CORONAVIRUS 2 (SARS-COV-2) (CORONAVIRUS DISEASE [COVID-19]), AMPLIFIED PROBE TECHNIQUE, MAKING USE OF HIGH THROUGHPUT TECHNOLOGIES AS DESCRIBED BY CMS-2020-01-R: HCPCS | Performed by: ANESTHESIOLOGY

## 2021-04-28 LAB
LABORATORY COMMENT REPORT: NORMAL
SARS-COV-2 RNA RESP QL NAA+PROBE: NEGATIVE
SPECIMEN SOURCE: NORMAL

## 2021-04-30 ENCOUNTER — ANESTHESIA EVENT (OUTPATIENT)
Dept: SURGERY | Facility: CLINIC | Age: 81
End: 2021-04-30
Payer: MEDICARE

## 2021-04-30 ENCOUNTER — HOSPITAL ENCOUNTER (OUTPATIENT)
Facility: CLINIC | Age: 81
Discharge: HOME | End: 2021-04-30
Attending: ANESTHESIOLOGY | Admitting: ANESTHESIOLOGY
Payer: MEDICARE

## 2021-04-30 ENCOUNTER — HOSPITAL ENCOUNTER (OUTPATIENT)
Dept: GENERAL RADIOLOGY | Facility: CLINIC | Age: 81
End: 2021-04-30
Attending: ANESTHESIOLOGY
Payer: MEDICARE

## 2021-04-30 ENCOUNTER — ANESTHESIA (OUTPATIENT)
Dept: SURGERY | Facility: CLINIC | Age: 81
End: 2021-04-30
Payer: MEDICARE

## 2021-04-30 VITALS
SYSTOLIC BLOOD PRESSURE: 118 MMHG | BODY MASS INDEX: 25.22 KG/M2 | RESPIRATION RATE: 16 BRPM | HEART RATE: 62 BPM | HEIGHT: 67 IN | OXYGEN SATURATION: 97 % | WEIGHT: 160.7 LBS | DIASTOLIC BLOOD PRESSURE: 75 MMHG

## 2021-04-30 DIAGNOSIS — M51.369 DEGENERATIVE DISC DISEASE, LUMBAR: ICD-10-CM

## 2021-04-30 PROCEDURE — 250N000011 HC RX IP 250 OP 636: Performed by: ANESTHESIOLOGY

## 2021-04-30 PROCEDURE — 370N000017 HC ANESTHESIA TECHNICAL FEE, PER MIN: Performed by: ANESTHESIOLOGY

## 2021-04-30 PROCEDURE — 250N000009 HC RX 250: Performed by: NURSE ANESTHETIST, CERTIFIED REGISTERED

## 2021-04-30 PROCEDURE — 250N000011 HC RX IP 250 OP 636: Performed by: NURSE ANESTHETIST, CERTIFIED REGISTERED

## 2021-04-30 PROCEDURE — 64483 NJX AA&/STRD TFRM EPI L/S 1: CPT | Mod: RT | Performed by: ANESTHESIOLOGY

## 2021-04-30 PROCEDURE — 999N000179 XR SURGERY CARM FLUORO LESS THAN 5 MIN W STILLS: Mod: TC

## 2021-04-30 PROCEDURE — 64483 NJX AA&/STRD TFRM EPI L/S 1: CPT | Performed by: ANESTHESIOLOGY

## 2021-04-30 PROCEDURE — 62323 NJX INTERLAMINAR LMBR/SAC: CPT | Performed by: ANESTHESIOLOGY

## 2021-04-30 PROCEDURE — 64484 NJX AA&/STRD TFRM EPI L/S EA: CPT | Mod: RT | Performed by: ANESTHESIOLOGY

## 2021-04-30 RX ORDER — PROPOFOL 10 MG/ML
INJECTION, EMULSION INTRAVENOUS PRN
Status: DISCONTINUED | OUTPATIENT
Start: 2021-04-30 | End: 2021-04-30

## 2021-04-30 RX ORDER — MULTIPLE VITAMINS W/ MINERALS TAB 9MG-400MCG
1 TAB ORAL DAILY
COMMUNITY

## 2021-04-30 RX ORDER — METHYLPREDNISOLONE ACETATE 40 MG/ML
INJECTION, SUSPENSION INTRA-ARTICULAR; INTRALESIONAL; INTRAMUSCULAR; SOFT TISSUE PRN
Status: DISCONTINUED | OUTPATIENT
Start: 2021-04-30 | End: 2021-04-30 | Stop reason: HOSPADM

## 2021-04-30 RX ORDER — IOPAMIDOL 612 MG/ML
INJECTION, SOLUTION INTRATHECAL PRN
Status: DISCONTINUED | OUTPATIENT
Start: 2021-04-30 | End: 2021-04-30 | Stop reason: HOSPADM

## 2021-04-30 RX ORDER — LIDOCAINE HYDROCHLORIDE 20 MG/ML
INJECTION, SOLUTION INFILTRATION; PERINEURAL PRN
Status: DISCONTINUED | OUTPATIENT
Start: 2021-04-30 | End: 2021-04-30

## 2021-04-30 RX ADMIN — PROPOFOL 100 MG: 10 INJECTION, EMULSION INTRAVENOUS at 13:25

## 2021-04-30 RX ADMIN — LIDOCAINE HYDROCHLORIDE 60 MG: 20 INJECTION, SOLUTION INFILTRATION; PERINEURAL at 13:25

## 2021-04-30 ASSESSMENT — MIFFLIN-ST. JEOR: SCORE: 1387.56

## 2021-04-30 ASSESSMENT — LIFESTYLE VARIABLES: TOBACCO_USE: 1

## 2021-04-30 NOTE — ANESTHESIA POSTPROCEDURE EVALUATION
Patient: Robby Yanez    Procedure(s):  right lumbar 4-5 and right lumbar 5 sacral 1 epidural    Diagnosis:DDD (degenerative disc disease), lumbar [M51.36]  Lumbar radiculopathy [M54.16]  History of spinal surgery [Z98.890]  Acute right-sided low back pain with right-sided sciatica [M54.41]  Diagnosis Additional Information: No value filed.    Anesthesia Type:  MAC    Note:  Disposition: Outpatient   Postop Pain Control: Uneventful            Sign Out: Well controlled pain   PONV: No   Neuro/Psych: Uneventful            Sign Out: Acceptable/Baseline neuro status   Airway/Respiratory: Uneventful            Sign Out: Acceptable/Baseline resp. status   CV/Hemodynamics: Uneventful            Sign Out: Acceptable CV status   Other NRE: NONE   DID A NON-ROUTINE EVENT OCCUR? No    Event details/Postop Comments:  Pt was happy with anesthesia care.  No complications.  I will follow up with the pt if needed.           Last vitals:  Vitals:    04/30/21 1246   BP: 134/83   Resp: 16       Last vitals prior to Anesthesia Care Transfer:  CRNA VITALS  4/30/2021 1306 - 4/30/2021 1340      4/30/2021             Pulse:  64    SpO2:  100 %    Resp Rate (observed):  19          Electronically Signed By: LISBETH Cabrera CRNA  April 30, 2021  1:40 PM

## 2021-04-30 NOTE — DISCHARGE INSTRUCTIONS
Home Care Instructions                Procedure: Epidural injection or joint injection    Activity:    Rest today    Do not work today    Resume normal activity tomorrow    Pain:    You may experience soreness at the injection site for 1 to 3 days.    You may use an ice pack for 20 minutes every 2 hours for the first 24 hours    You may use a heating pad after the first 24 hours    You may use Tylenol  (acetaminophen) every 4 hours or other pain medicines as directed by your physician    Safety  Sedation medicine, if given may remain active for many hours.    It is important for the next 24 hours that you do not:    Drive a car    Operate machines or power tools    Consume alcohol, including beer    Sign any important papers or legal documents    You may experience numbness radiating into your legs or arms, (depending on the procedure location)  This numbness may last several hours.  Until the numb sensation returns to normal please use caution in walking, climbing stairs, stepping out of your vehicle, etc.    Common side effects of steroids:  Not everyone will experience corticosteroid side effects. If side effects are experienced they will gradually subside in the 7-10 day period following an injection.    Most common side effects include:    Flushed face and/or chest    Feeling of warmth, particularly in face but could be overall feeling of warmth    Increased blood sugar in diabetic patients    Menstrual irregularities may occur.  If taking hormone based birth control an alternate method of birth control is recommended    Sleep disturbances and/or mood swings are possible    Leg cramps    Please contact us if you have:  Severe pain   Fever more than 101.5 degrees Fahrenheit  Signs of infection (redness, swelling or drainage)      If you have questions during normal business hours (8am-5pm Monday-Friday) contact the [facility] at [phone]. If you need help after hours, we recommend that you go to a  hospital emergency room or dial 911.

## 2021-04-30 NOTE — ANESTHESIA PREPROCEDURE EVALUATION
Anesthesia Pre-Procedure Evaluation    Patient: Robby Yanez   MRN: [id number] : 1940        Preoperative Diagnosis: DDD (degenerative disc disease), lumbar [M51.36]  Lumbar radiculopathy [M54.16]  History of spinal surgery [Z98.890]  Acute right-sided low back pain with right-sided sciatica [M54.41]   Procedure : Procedure(s):  INJECTION, SPINE, LUMBAR, EPIDURAL     No past medical history on file.   No past surgical history on file.   Allergies   Allergen Reactions     Penicillins       Social History     Tobacco Use     Smoking status: Former Smoker     Smokeless tobacco: Never Used     Tobacco comment: has not smoked in 60 yrs   Substance Use Topics     Alcohol use: Not Currently      Wt Readings from Last 1 Encounters:   21 72.9 kg (160 lb 11.2 oz)        Anesthesia Evaluation            ROS/MED HX  ENT/Pulmonary:     (+) tobacco use, Past use,     Neurologic:  - neg neurologic ROS     Cardiovascular:  - neg cardiovascular ROS     METS/Exercise Tolerance:     Hematologic:  - neg hematologic  ROS     Musculoskeletal: Comment: DDD      GI/Hepatic:  - neg GI/hepatic ROS     Renal/Genitourinary:  - neg Renal ROS     Endo:  - neg endo ROS     Psychiatric/Substance Use:  - neg psychiatric ROS     Infectious Disease:  - neg infectious disease ROS     Malignancy:  - neg malignancy ROS     Other:  - neg other ROS          Physical Exam    Airway        Mallampati: II   TM distance: > 3 FB   Neck ROM: full   Mouth opening: > 3 cm    Respiratory Devices and Support         Dental           Cardiovascular   cardiovascular exam normal          Pulmonary   pulmonary exam normal                OUTSIDE LABS:  CBC:   Lab Results   Component Value Date    WBC 4.5 2021    HGB 15.0 2021    HCT 43.3 2021     (L) 2021     BMP:   Lab Results   Component Value Date     2021    POTASSIUM 4.3 2021    CHLORIDE 103 2021    CO2 30 2021    BUN 17 2021     CR 1.18 04/23/2021     (H) 04/23/2021     COAGS: No results found for: PTT, INR, FIBR  POC: No results found for: BGM, HCG, HCGS  HEPATIC: No results found for: ALBUMIN, PROTTOTAL, ALT, AST, GGT, ALKPHOS, BILITOTAL, BILIDIRECT, DOYLE  OTHER:   Lab Results   Component Value Date    FELIPA 8.9 04/23/2021       Anesthesia Plan    ASA Status:  2   NPO Status:  NPO Appropriate    Anesthesia Type: MAC.     - Reason for MAC: straight local not clinically adequate   Induction: Intravenous, Propofol.   Maintenance: TIVA.        Consents    Anesthesia Plan(s) and associated risks, benefits, and realistic alternatives discussed. Questions answered and patient/representative(s) expressed understanding.     - Discussed with:  Patient      - Extended Intubation/Ventilatory Support Discussed: No.      - Patient is DNR/DNI Status: No    Use of blood products discussed: No .     Postoperative Care       PONV prophylaxis: Ondansetron (or other 5HT-3)     Comments:    The risks and benefits of anesthesia, and the alternatives where applicable, have been discussed with the patient, and they wish to proceed.            LISBETH Westfall CRNA

## 2021-04-30 NOTE — ANESTHESIA CARE TRANSFER NOTE
Patient: Robby Yanez    Procedure(s):  right lumbar 4-5 and right lumbar 5 sacral 1 epidural    Diagnosis: DDD (degenerative disc disease), lumbar [M51.36]  Lumbar radiculopathy [M54.16]  History of spinal surgery [Z98.890]  Acute right-sided low back pain with right-sided sciatica [M54.41]  Diagnosis Additional Information: No value filed.    Anesthesia Type:   MAC     Note:    Oropharynx: spontaneously breathing  Level of Consciousness: awake  Oxygen Supplementation: room air    Independent Airway: airway patency satisfactory and stable  Dentition: dentition unchanged  Vital Signs Stable: post-procedure vital signs reviewed and stable  Report to RN Given: handoff report given  Patient transferred to: Phase II    Handoff Report: Identifed the Patient, Identified the Reponsible Provider, Reviewed the pertinent medical history, Discussed the surgical course, Reviewed Intra-OP anesthesia mangement and issues during anesthesia, Set expectations for post-procedure period and Allowed opportunity for questions and acknowledgement of understanding      Vitals: (Last set prior to Anesthesia Care Transfer)  CRNA VITALS  4/30/2021 1306 - 4/30/2021 1340      4/30/2021             Pulse:  64    SpO2:  100 %    Resp Rate (observed):  19        Electronically Signed By: LISBETH Cabrera CRNA  April 30, 2021  1:40 PM

## 2021-04-30 NOTE — OP NOTE
PRIMARY PROBLEM: Low back pain    PROCEDURE:   Transforaminal Epidural Steroid Injection with fluoroscopic guidance and contrast.     PROCEDURE DETAILS: After written informed consent was obtained from the patient, the patient was escorted to the procedure room.  The patient was placed in the prone position.  A  time out  was conducted to verify patient identity, procedure to be performed, side, site, allergies and any special requirements.  The skin over the thoracolumbar region was prepped and draped in normal sterile fashion. Fluoroscopy was used to identify the neural foramen in AP view and the skin was anesthetized with 2 mL of 1% lidocaine with bicarbonate buffer. Two 25-gauge Quincke spinal needles were advanced through this location and advanced under fluoroscopic guidance towards the neural foramens.  The target zone was the 6 o clock position of the pedicle.   Prior to entering the foramen, the depth of the needle was gauged with a lateral view on fluoroscopy. While still in a lateral view, the needle was slowly advanced to avoid injury to the spinal nerve.  Then, in the oblique view (approximately 28 degrees), after negative aspiration, 1.5 mL of Omnipaque contrast dye was injected revealing epidural spread without evidence of intravascular or intrathecal spread.  I had proper spread of medication at the right L4-5 and L5-S1 foraminal openings.  There was obvious epidural scarring in his central epidural space due to the laminectomies.  Therefore, I had spread medication primarily over the nerve roots at L4 and L5.  There is no evidence of any intravascular or intrathecal or intraneural injection and I follow this up by an injection of 1.5 cc of solution into each foramen containing 20 mg of Depo-Medrol with 1 cc of preservative-free normal saline.  After injection of the medication, as the needle tip was withdrawn, it was flushed with local anesthetic.   The patient was monitored with blood pressure and  pulse oximetry machines with the assistance of an RN throughout the procedure.  The patient was alert and responsive to questions throughout the procedure.   The patient tolerated the procedure well and was observed in the post-procedural area.  The patient was dismissed without apparent complications.     BLOOD LOSS: < 5 cc    DIAGNOSIS:  1.  Right lumbar radiculopathy in the L4 and L5 distributions  2.  History of a 4 level decompression  3.  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 Performed right L4-5 and L5-S1  transforaminal epidural steroid injections.  2. The patient was instructed to follow-up per Dr. Moulton's instructions.      Kai Moulton MD  Diplomate of the American Board of Anesthesiology, Pain Medicine

## 2021-11-15 ENCOUNTER — APPOINTMENT (OUTPATIENT)
Dept: CT IMAGING | Facility: CLINIC | Age: 81
End: 2021-11-15
Attending: PHYSICIAN ASSISTANT
Payer: MEDICARE

## 2021-11-15 ENCOUNTER — HOSPITAL ENCOUNTER (EMERGENCY)
Facility: CLINIC | Age: 81
Discharge: HOME | End: 2021-11-15
Attending: PHYSICIAN ASSISTANT | Admitting: PHYSICIAN ASSISTANT
Payer: MEDICARE

## 2021-11-15 VITALS
RESPIRATION RATE: 17 BRPM | SYSTOLIC BLOOD PRESSURE: 118 MMHG | TEMPERATURE: 98.2 F | WEIGHT: 160 LBS | OXYGEN SATURATION: 98 % | HEART RATE: 61 BPM | BODY MASS INDEX: 25.06 KG/M2 | DIASTOLIC BLOOD PRESSURE: 87 MMHG

## 2021-11-15 DIAGNOSIS — R42 DIZZINESS: ICD-10-CM

## 2021-11-15 DIAGNOSIS — H61.23 BILATERAL IMPACTED CERUMEN: ICD-10-CM

## 2021-11-15 LAB
ALBUMIN SERPL-MCNC: 3.9 G/DL (ref 3.4–5)
ALP SERPL-CCNC: 64 U/L (ref 40–150)
ALT SERPL W P-5'-P-CCNC: 32 U/L (ref 0–70)
ANION GAP SERPL CALCULATED.3IONS-SCNC: 2 MMOL/L (ref 3–14)
AST SERPL W P-5'-P-CCNC: 26 U/L (ref 0–45)
BASOPHILS # BLD AUTO: 0 10E3/UL (ref 0–0.2)
BASOPHILS NFR BLD AUTO: 1 %
BILIRUB SERPL-MCNC: 0.6 MG/DL (ref 0.2–1.3)
BUN SERPL-MCNC: 25 MG/DL (ref 7–30)
CALCIUM SERPL-MCNC: 8.9 MG/DL (ref 8.5–10.1)
CHLORIDE BLD-SCNC: 107 MMOL/L (ref 94–109)
CO2 SERPL-SCNC: 29 MMOL/L (ref 20–32)
CREAT SERPL-MCNC: 1.03 MG/DL (ref 0.66–1.25)
EOSINOPHIL # BLD AUTO: 0.1 10E3/UL (ref 0–0.7)
EOSINOPHIL NFR BLD AUTO: 3 %
ERYTHROCYTE [DISTWIDTH] IN BLOOD BY AUTOMATED COUNT: 12.3 % (ref 10–15)
GFR SERPL CREATININE-BSD FRML MDRD: 67 ML/MIN/1.73M2
GLUCOSE BLD-MCNC: 80 MG/DL (ref 70–99)
HCT VFR BLD AUTO: 40.7 % (ref 40–53)
HGB BLD-MCNC: 14 G/DL (ref 13.3–17.7)
IMM GRANULOCYTES # BLD: 0 10E3/UL
IMM GRANULOCYTES NFR BLD: 0 %
LYMPHOCYTES # BLD AUTO: 1.5 10E3/UL (ref 0.8–5.3)
LYMPHOCYTES NFR BLD AUTO: 33 %
MCH RBC QN AUTO: 32.6 PG (ref 26.5–33)
MCHC RBC AUTO-ENTMCNC: 34.4 G/DL (ref 31.5–36.5)
MCV RBC AUTO: 95 FL (ref 78–100)
MONOCYTES # BLD AUTO: 0.4 10E3/UL (ref 0–1.3)
MONOCYTES NFR BLD AUTO: 9 %
NEUTROPHILS # BLD AUTO: 2.6 10E3/UL (ref 1.6–8.3)
NEUTROPHILS NFR BLD AUTO: 54 %
NRBC # BLD AUTO: 0 10E3/UL
NRBC BLD AUTO-RTO: 0 /100
PLATELET # BLD AUTO: 138 10E3/UL (ref 150–450)
POTASSIUM BLD-SCNC: 4.4 MMOL/L (ref 3.4–5.3)
PROT SERPL-MCNC: 7.2 G/DL (ref 6.8–8.8)
RBC # BLD AUTO: 4.29 10E6/UL (ref 4.4–5.9)
SODIUM SERPL-SCNC: 138 MMOL/L (ref 133–144)
TROPONIN I SERPL-MCNC: <0.015 UG/L (ref 0–0.04)
TSH SERPL DL<=0.005 MIU/L-ACNC: 1.18 MU/L (ref 0.4–4)
WBC # BLD AUTO: 4.7 10E3/UL (ref 4–11)

## 2021-11-15 PROCEDURE — 84484 ASSAY OF TROPONIN QUANT: CPT | Performed by: PHYSICIAN ASSISTANT

## 2021-11-15 PROCEDURE — 93005 ELECTROCARDIOGRAM TRACING: CPT | Performed by: PHYSICIAN ASSISTANT

## 2021-11-15 PROCEDURE — 85025 COMPLETE CBC W/AUTO DIFF WBC: CPT | Performed by: PHYSICIAN ASSISTANT

## 2021-11-15 PROCEDURE — 70450 CT HEAD/BRAIN W/O DYE: CPT

## 2021-11-15 PROCEDURE — 99284 EMERGENCY DEPT VISIT MOD MDM: CPT | Mod: 25 | Performed by: PHYSICIAN ASSISTANT

## 2021-11-15 PROCEDURE — 93010 ELECTROCARDIOGRAM REPORT: CPT | Performed by: PHYSICIAN ASSISTANT

## 2021-11-15 PROCEDURE — 99285 EMERGENCY DEPT VISIT HI MDM: CPT | Mod: 25 | Performed by: PHYSICIAN ASSISTANT

## 2021-11-15 PROCEDURE — 82040 ASSAY OF SERUM ALBUMIN: CPT | Performed by: PHYSICIAN ASSISTANT

## 2021-11-15 PROCEDURE — 36415 COLL VENOUS BLD VENIPUNCTURE: CPT | Performed by: PHYSICIAN ASSISTANT

## 2021-11-15 PROCEDURE — 84443 ASSAY THYROID STIM HORMONE: CPT | Performed by: PHYSICIAN ASSISTANT

## 2021-11-15 RX ORDER — OMEPRAZOLE 20 MG/1
20 TABLET, DELAYED RELEASE ORAL DAILY
COMMUNITY
End: 2021-12-14

## 2021-11-15 NOTE — DISCHARGE INSTRUCTIONS
It was a pleasure working with you today!  Thankfully, we did not find anything emergent with your evaluation today.  You did not have any evidence for stroke or bleeding in your brain.  Your blood tests were all very reassuring.  EKG for your heart looked great as well.  I am concerned that you may have a component of vertigo.  If you are having a hard day with your dizziness, you could try 12.5 mg of meclizine (which is an over-the-counter medication) every 8 hours as needed.  I placed a referral to the primary care clinic.  This should be contacting you to set up a recheck appointment.  They will determine if a referral to physical therapy would be beneficial for you.

## 2021-11-15 NOTE — ED TRIAGE NOTES
Has been struggling with dizziness for quite some time and yesterday it got worse.  Think it might be vertigo and states he has been worked up with brain scans and found nothing. Denies nausea

## 2021-11-15 NOTE — ED PROVIDER NOTES
History     Chief Complaint   Patient presents with     Dizziness     HPI  Robby Yanez is a 80 year old male who presents for evaluation of dizziness.  He states that he has had this off and on for 3-4 years.  Reports that he underwent multiple work-ups while he was living in [place].  He underwent scanning which it sounds like he describes CT and MRI evaluation in 2018 and 2020.  He states he was told that he did not have a stroke and that there was no significant change on the scans.  He gets minor symptoms almost every day.  He feels like symptoms were worse yesterday when he went to [place].  He states that he was having troubles focusing and he felt off balance.  Symptoms are much worse with movement or standing up.  He states that he will fall into the wall occasionally.  He is always able to brace himself and has never fallen to the ground.  He states that symptoms last a few seconds and then resolved.   Denies any head trauma recently.  No falls.  He is is not on any anticoagulant medication.  He does not feel palpitations or dyspnea when symptoms occur.  Denies any lower extremity edema or calf pain.  Denies any recent fever, chills, rhinorrhea, congestion, or change in taste/smell sensation.  He otherwise feels well.  Denies any outpatient treatment for the symptoms in the past.  Denies seeing physical therapy in the past.          Allergies:  Allergies   Allergen Reactions     Penicillins        Problem List:    Patient Active Problem List    Diagnosis Date Noted     DDD (degenerative disc disease), lumbar 04/23/2021     Priority: Medium     Status post laminectomy 04/23/2021     Priority: Medium     Lumbar radiculopathy 04/23/2021     Priority: Medium     Hyperlipidemia LDL goal <100 04/23/2021     Priority: Medium        Past Medical History:    No past medical history on file.    Past Surgical History:    Past Surgical History:   Procedure Laterality Date     INJECT EPIDURAL LUMBAR N/A  4/30/2021    Procedure: right lumbar 4-5 and right lumbar 5 sacral 1 epidural;  Surgeon: Kai Moulton MD;  Location: [facility] OR       Family History:    No family history on file.    Social History:  Marital Status:  Single [1]  Social History     Tobacco Use     Smoking status: Former Smoker     Smokeless tobacco: Never Used     Tobacco comment: has not smoked in 60 yrs   Substance Use Topics     Alcohol use: Not Currently     Drug use: Never        Medications:    multivitamin w/minerals (THERA-VIT-M) tablet  omeprazole (PRILOSEC OTC) 20 MG EC tablet          Review of Systems   All other systems reviewed and are negative.      Physical Exam   BP: (!) 143/74  Pulse: 64  Temp: 98.2  F (36.8  C)  Resp: 17  Weight: 72.6 kg (160 lb)  SpO2: 97 %  Lying Orthostatic BP: 118/87  Lying Orthostatic Pulse: 55 bpm  Sitting Orthostatic BP: 148/118  Sitting Orthostatic Pulse: 55 bpm  Standing Orthostatic BP: 140/83  Standing Orthostatic Pulse: 61 bpm      Physical Exam  Vitals and nursing note reviewed.   Constitutional:       General: He is not in acute distress.     Appearance: He is not ill-appearing, toxic-appearing or diaphoretic.   HENT:      Head: Normocephalic and atraumatic.      Right Ear: There is impacted cerumen.      Left Ear: There is impacted cerumen.      Nose: Nose normal. No congestion or rhinorrhea.      Mouth/Throat:      Mouth: Mucous membranes are moist.      Pharynx: No oropharyngeal exudate or posterior oropharyngeal erythema.   Eyes:      General: No visual field deficit or scleral icterus.        Right eye: No discharge.         Left eye: No discharge.      Conjunctiva/sclera: Conjunctivae normal.      Pupils: Pupils are equal, round, and reactive to light.   Neck:      Thyroid: No thyromegaly.   Cardiovascular:      Rate and Rhythm: Normal rate and regular rhythm.      Heart sounds: Normal heart sounds. No murmur heard.      Pulmonary:      Effort: Pulmonary effort is normal. No respiratory distress.       Breath sounds: Normal breath sounds. No wheezing or rales.   Chest:      Chest wall: No tenderness.   Abdominal:      General: Bowel sounds are normal. There is no distension.      Palpations: Abdomen is soft. There is no mass.      Tenderness: There is no abdominal tenderness. There is no right CVA tenderness, left CVA tenderness, guarding or rebound.   Musculoskeletal:         General: No tenderness or deformity. Normal range of motion.      Cervical back: Normal range of motion and neck supple.   Lymphadenopathy:      Cervical: No cervical adenopathy.   Skin:     General: Skin is warm and dry.      Capillary Refill: Capillary refill takes less than 2 seconds.      Findings: No erythema or rash.   Neurological:      Mental Status: He is alert and oriented to person, place, and time.      GCS: GCS eye subscore is 4. GCS verbal subscore is 5. GCS motor subscore is 6.      Cranial Nerves: Cranial nerves are intact. No cranial nerve deficit, dysarthria or facial asymmetry.      Sensory: Sensation is intact. No sensory deficit.      Motor: Motor function is intact. No weakness, tremor, atrophy, abnormal muscle tone, seizure activity or pronator drift.      Coordination: Coordination is intact. Romberg sign negative. Coordination normal. Finger-Nose-Finger Test and Heel to Shin Test normal. Rapid alternating movements normal.      Gait: Gait is intact. Gait and tandem walk normal.      Deep Tendon Reflexes:      Reflex Scores:       Bicep reflexes are 2+ on the right side and 2+ on the left side.       Patellar reflexes are 2+ on the right side and 2+ on the left side.     Comments: Nonfocal and normal neurological exam.  Marisa Hallpike maneuver performed and could not induce symptoms.  He did not have any nystagmus.  I could not reproduce symptoms here in the ED other than some minor dizziness when we had him stand.  No nystagmus at that point.   Psychiatric:         Behavior: Behavior normal.         Thought  Content: Thought content normal.         ED Course        Procedures              EKG Interpretation:      Interpreted by Hector Smyth PA-C  Time reviewed: 1:15 PM   Symptoms at time of EKG: dizziness.   Rhythm: normal sinus   Rate: normal  Axis: normal  Ectopy: none  Conduction: normal  ST Segments/ T Waves: No ST-T wave changes  Q Waves: none  Comparison to prior: No old EKG available    Clinical Impression: normal EKG          Critical Care time:  none               Results for orders placed or performed during the hospital encounter of 11/15/21 (from the past 24 hour(s))   CBC with platelets differential    Narrative    The following orders were created for panel order CBC with platelets differential.  Procedure                               Abnormality         Status                     ---------                               -----------         ------                     CBC with platelets and d...[[id number]]  Abnormal            Final result                 Please view results for these tests on the individual orders.   Comprehensive metabolic panel   Result Value Ref Range    Sodium 138 133 - 144 mmol/L    Potassium 4.4 3.4 - 5.3 mmol/L    Chloride 107 94 - 109 mmol/L    Carbon Dioxide (CO2) 29 20 - 32 mmol/L    Anion Gap 2 (L) 3 - 14 mmol/L    Urea Nitrogen 25 7 - 30 mg/dL    Creatinine 1.03 0.66 - 1.25 mg/dL    Calcium 8.9 8.5 - 10.1 mg/dL    Glucose 80 70 - 99 mg/dL    Alkaline Phosphatase 64 40 - 150 U/L    AST 26 0 - 45 U/L    ALT 32 0 - 70 U/L    Protein Total 7.2 6.8 - 8.8 g/dL    Albumin 3.9 3.4 - 5.0 g/dL    Bilirubin Total 0.6 0.2 - 1.3 mg/dL    GFR Estimate 67 >60 mL/min/1.73m2   Troponin I   Result Value Ref Range    Troponin I <0.015 0.000 - 0.045 ug/L   TSH with free T4 reflex   Result Value Ref Range    TSH 1.18 0.40 - 4.00 mU/L   CBC with platelets and differential   Result Value Ref Range    WBC Count 4.7 4.0 - 11.0 10e3/uL    RBC Count 4.29 (L) 4.40 - 5.90 10e6/uL    Hemoglobin 14.0  13.3 - 17.7 g/dL    Hematocrit 40.7 40.0 - 53.0 %    MCV 95 78 - 100 fL    MCH 32.6 26.5 - 33.0 pg    MCHC 34.4 31.5 - 36.5 g/dL    RDW 12.3 10.0 - 15.0 %    Platelet Count 138 (L) 150 - 450 10e3/uL    % Neutrophils 54 %    % Lymphocytes 33 %    % Monocytes 9 %    % Eosinophils 3 %    % Basophils 1 %    % Immature Granulocytes 0 %    NRBCs per 100 WBC 0 <1 /100    Absolute Neutrophils 2.6 1.6 - 8.3 10e3/uL    Absolute Lymphocytes 1.5 0.8 - 5.3 10e3/uL    Absolute Monocytes 0.4 0.0 - 1.3 10e3/uL    Absolute Eosinophils 0.1 0.0 - 0.7 10e3/uL    Absolute Basophils 0.0 0.0 - 0.2 10e3/uL    Absolute Immature Granulocytes 0.0 <=0.0 10e3/uL    Absolute NRBCs 0.0 10e3/uL   CT Head w/o Contrast    Narrative    CT SCAN OF THE HEAD WITHOUT CONTRAST   11/15/2021 2:53 PM     HISTORY: Dizziness    TECHNIQUE:  Axial images of the head and coronal reformations without  IV contrast material. Radiation dose for this scan was reduced using  automated exposure control, adjustment of the mA and/or kV according  to patient size, or iterative reconstruction technique.    COMPARISON: None.    FINDINGS: The ventricles are normal in size and configuration. Mild  generalized brain parenchymal volume loss. Mild-to-moderate scattered  nonspecific patchy hypoattenuation in the cerebral white matter, most  likely due to chronic small vessel ischemic disease. No acute  intracranial hemorrhage, mass lesion, or mass effect/herniation. No  definite CT findings of acute infarct. Atherosclerotic calcifications  involving the bilateral carotid siphons.    The visualized orbits appear normal. Trace/mild scattered paranasal  sinus mucosal thickening. Partially visualized degenerative changes of  the upper cervical spine/craniocervical junction.      Impression    IMPRESSION:  1. No CT findings of acute intracranial process.  2. Brain atrophy and presumed chronic small vessel ischemic changes,  as described.    ELIJAH OCONNOR MD         SYSTEM ID:   [id number]       Medications - No data to display    Assessments & Plan (with Medical Decision Making)     Dizziness  Bilateral impacted cerumen     80 year old male presents for evaluation of chronic episodes of dizziness and balance issues that occurred for a few seconds and then generally resolved.  Symptoms worse yesterday when he had troubles focusing with his vision while bowling.  He also felt dizzy.  Better when bracing himself.  Symptoms then resolve on their own.  Seems to be worse when he stands up, with movement, and/or walking.  Has undergone imaging evaluation 2 different times in 2018 and 2020 and has been told that he did not have any abnormalities.  No outpatient evaluation for this.  Work-up occurred in [place].  On exam blood pressure 118/87, temperature 98.2, pulse 61, respiration 17, oxygen saturation 98% on room air.  Patient is in no acute distress.  Neurologically intact.  Nonfocal neurological exam.  We could induce some minor dizziness when he stood up, but no nystagmus.  Neurologically otherwise intact without other abnormalities.  Cardiopulmonary exam normal.  See exam above for further details.  IV was established.  Laboratory levels display no acute abnormality with CBC, comprehensive metabolic panel, TSH, and troponin.  CT of the head without acute intracranial process.  EKG without arrhythmia or other abnormalities.  No acute ST or T wave change.  Patient remained asymptomatic throughout his ED stay.  He does not have any symptoms at rest.  No orthostasis with vital sign check.  This could be an atypical presentation of vertigo.  We discussed a negative work-up for acute emergent conditions.  Further evaluation could be performed with MRI/MRA, but this does not need to be done emergently given ongoing symptoms for 3+ years.  I placed a primary care referral for follow-up in the next few days.  He may be a  candidate for physical therapy referral.  He does have significant cerumen  impaction, but he states that he has been able to resolve this with home care measures in the past.  He would rather do this at home then get the ears flushed out here in the ED.  We did discuss he could trial meclizine 12.5 mg every 8 hours as needed for any breakthrough symptoms.  Push clear fluids.  Indications for ED return reviewed.  Patient was in agreement with this plan and suitable for discharge.     I have reviewed the nursing notes.    I have reviewed the findings, diagnosis, plan and need for follow up with the patient.       New Prescriptions    No medications on file       Final diagnoses:   Dizziness   Bilateral impacted cerumen     Disclaimer: This note consists of symbols derived from keyboarding, dictation and/or voice recognition software. As a result, there may be errors in the script that have gone undetected. Please consider this when interpreting information found in this chart.      11/15/2021   [facility] EMERGENCY DEPT     Hector Smyth PA-C  11/15/21 154

## 2021-12-07 ENCOUNTER — OFFICE VISIT (OUTPATIENT)
Dept: URGENT CARE | Facility: URGENT CARE | Age: 81
End: 2021-12-07
Payer: MEDICARE

## 2021-12-07 VITALS
SYSTOLIC BLOOD PRESSURE: 142 MMHG | OXYGEN SATURATION: 96 % | DIASTOLIC BLOOD PRESSURE: 77 MMHG | HEART RATE: 68 BPM | BODY MASS INDEX: 24.93 KG/M2 | TEMPERATURE: 96.8 F | WEIGHT: 159.2 LBS | RESPIRATION RATE: 18 BRPM

## 2021-12-07 DIAGNOSIS — H66.001 RIGHT ACUTE SUPPURATIVE OTITIS MEDIA: Primary | ICD-10-CM

## 2021-12-07 PROCEDURE — 99213 OFFICE O/P EST LOW 20 MIN: CPT | Performed by: NURSE PRACTITIONER

## 2021-12-07 RX ORDER — AZITHROMYCIN 250 MG/1
TABLET, FILM COATED ORAL
Qty: 6 TABLET | Refills: 0 | Status: SHIPPED | OUTPATIENT
Start: 2021-12-07 | End: 2021-12-12

## 2021-12-07 ASSESSMENT — PAIN SCALES - GENERAL: PAINLEVEL: SEVERE PAIN (7)

## 2021-12-07 NOTE — PATIENT INSTRUCTIONS
Patient Education     Reducing the Risk for Middle Ear Infections  Most children have had at least one middle ear infection by age 2. Treatment may depend on whether the problem is acute or chronic. It also depends on how often it comes back and how long it lasts.   Reducing risk factors     Good handwashing can help your child prevent ear infections.   Some behaviors or things raise your child s risk for an ear infection. Reducing these risks can be helpful at any point in treatment. Here are some tips:     Make sure your child knows how to wash his or her hands the right way. This includes washing hands often with soap and water. Your child can use a hand  when needed.    If your child goes to group , he or she has a greater risk of getting colds or the flu. These may then lead to an ear infection. Help prevent these illnesses by teaching your child to wash his or her hands often.    Also keep your child away from crowds during cold and flu season.    Tell your child to stay away from secondhand smoke and other irritants. Don t let anyone smoke in your home.    If your child has nasal allergies, do your best to control dust, mold, mildew, and pet hair and dander in the house.    If food allergies are a problem, identify the food that triggers the reaction. Help your child stay away from it.    Make sure your child is up-to-date on all vaccines.  In your child's first year of life, you can also reduce the risk of ear infections by:     Breastfeeding for at least 3 months    Not giving your child a pacifier after 6 months of age  Watching and waiting  If your child is diagnosed with an ear infection, the healthcare provider may prescribe antibiotics right away or suggest a period of  watchful waiting.  This means not filling the prescription right away. Instead, you will first try medicines to ease your child s symptoms, such as those for pain or a fever. You ll then wait to see if your child gets  better.   If your child doesn't get better within a few days or develops new symptoms, such as a fever or vomiting, antibiotics will often be started. Whether or not your child's healthcare provider prescribes antibiotics right away or advises a period of watchful waiting depends on your child's age and risk factors.   [employer] last reviewed this educational content on 2/1/2020 2000-2021 [employer]. All rights reserved. This information is not intended as a substitute for professional medical care. Always follow your healthcare professional's instructions.

## 2021-12-07 NOTE — PROGRESS NOTES
SUBJECTIVE:   Robby Yanez is a 80 year old male presenting with a chief complaint of   Chief Complaint   Patient presents with     Ear Problem     Patient has been having ear pain in the right ear- pain is getting a lot worse and is going into the back of his head- sharp digging pains in the back of his head comes and goes.        He is an established patient of [facility].    Right ear pain    Onset of symptoms was 1 week(s) ago.  Course of illness is worsening.    Severity moderate  Current and Associated symptoms: ear pain right  Treatment measures tried include None tried.  Predisposing factors include None.      Review of Systems   HENT: Positive for ear pain.    All other systems reviewed and are negative.      No past medical history on file.  No family history on file.  Current Outpatient Medications   Medication Sig Dispense Refill     azithromycin (ZITHROMAX) 250 MG tablet Take 2 tablets (500 mg) by mouth daily for 1 day, THEN 1 tablet (250 mg) daily for 4 days. 6 tablet 0     multivitamin w/minerals (THERA-VIT-M) tablet Take 1 tablet by mouth daily       omeprazole (PRILOSEC OTC) 20 MG EC tablet Take 20 mg by mouth daily       Social History     Tobacco Use     Smoking status: Former Smoker     Smokeless tobacco: Never Used     Tobacco comment: has not smoked in 60 yrs   Substance Use Topics     Alcohol use: Not Currently       OBJECTIVE  BP (!) 142/77   Pulse 68   Temp 96.8  F (36  C) (Tympanic)   Resp 18   Wt 72.2 kg (159 lb 3.2 oz)   SpO2 96%   BMI 24.93 kg/m      Physical Exam  HENT:      Right Ear: Tympanic membrane is erythematous and bulging.      Mouth/Throat:      Mouth: Mucous membranes are moist.   Cardiovascular:      Rate and Rhythm: Normal rate.   Pulmonary:      Effort: Pulmonary effort is normal.      Breath sounds: Normal breath sounds and air entry.   Skin:     General: Skin is warm and dry.   Neurological:      General: No focal deficit present.         ASSESSMENT:       ICD-10-CM    1. Right acute suppurative otitis media  H66.001 azithromycin (ZITHROMAX) 250 MG tablet        PLAN:  Patient educational/instructional material provided including reasons for follow-up    The patient indicates understanding of these issues and agrees with the plan.        Patient Instructions       Patient Education     Reducing the Risk for Middle Ear Infections  Most children have had at least one middle ear infection by age 2. Treatment may depend on whether the problem is acute or chronic. It also depends on how often it comes back and how long it lasts.   Reducing risk factors     Good handwashing can help your child prevent ear infections.   Some behaviors or things raise your child s risk for an ear infection. Reducing these risks can be helpful at any point in treatment. Here are some tips:     Make sure your child knows how to wash his or her hands the right way. This includes washing hands often with soap and water. Your child can use a hand  when needed.    If your child goes to group , he or she has a greater risk of getting colds or the flu. These may then lead to an ear infection. Help prevent these illnesses by teaching your child to wash his or her hands often.    Also keep your child away from crowds during cold and flu season.    Tell your child to stay away from secondhand smoke and other irritants. Don t let anyone smoke in your home.    If your child has nasal allergies, do your best to control dust, mold, mildew, and pet hair and dander in the house.    If food allergies are a problem, identify the food that triggers the reaction. Help your child stay away from it.    Make sure your child is up-to-date on all vaccines.  In your child's first year of life, you can also reduce the risk of ear infections by:     Breastfeeding for at least 3 months    Not giving your child a pacifier after 6 months of age  Watching and waiting  If your child is diagnosed with an ear  infection, the healthcare provider may prescribe antibiotics right away or suggest a period of  watchful waiting.  This means not filling the prescription right away. Instead, you will first try medicines to ease your child s symptoms, such as those for pain or a fever. You ll then wait to see if your child gets better.   If your child doesn't get better within a few days or develops new symptoms, such as a fever or vomiting, antibiotics will often be started. Whether or not your child's healthcare provider prescribes antibiotics right away or advises a period of watchful waiting depends on your child's age and risk factors.   [employer] last reviewed this educational content on 2/1/2020 2000-2021 [employer]. All rights reserved. This information is not intended as a substitute for professional medical care. Always follow your healthcare professional's instructions.

## 2021-12-11 ENCOUNTER — OFFICE VISIT (OUTPATIENT)
Dept: URGENT CARE | Facility: URGENT CARE | Age: 81
End: 2021-12-11
Payer: MEDICARE

## 2021-12-11 ENCOUNTER — NURSE TRIAGE (OUTPATIENT)
Dept: NURSING | Facility: CLINIC | Age: 81
End: 2021-12-11
Payer: MEDICARE

## 2021-12-11 VITALS
DIASTOLIC BLOOD PRESSURE: 78 MMHG | TEMPERATURE: 97.9 F | OXYGEN SATURATION: 97 % | HEART RATE: 60 BPM | SYSTOLIC BLOOD PRESSURE: 157 MMHG

## 2021-12-11 DIAGNOSIS — M62.838 TRAPEZIUS MUSCLE SPASM: ICD-10-CM

## 2021-12-11 DIAGNOSIS — S16.1XXA STRAIN OF NECK MUSCLE, INITIAL ENCOUNTER: ICD-10-CM

## 2021-12-11 DIAGNOSIS — H61.23 BILATERAL IMPACTED CERUMEN: Primary | ICD-10-CM

## 2021-12-11 DIAGNOSIS — H60.501 ACUTE OTITIS EXTERNA OF RIGHT EAR, UNSPECIFIED TYPE: ICD-10-CM

## 2021-12-11 PROCEDURE — 99213 OFFICE O/P EST LOW 20 MIN: CPT | Mod: 25 | Performed by: FAMILY MEDICINE

## 2021-12-11 PROCEDURE — 69210 REMOVE IMPACTED EAR WAX UNI: CPT | Performed by: FAMILY MEDICINE

## 2021-12-11 RX ORDER — METHOCARBAMOL 750 MG/1
750 TABLET, FILM COATED ORAL 3 TIMES DAILY PRN
Qty: 30 TABLET | Refills: 1 | Status: SHIPPED | OUTPATIENT
Start: 2021-12-11

## 2021-12-11 RX ORDER — CIPROFLOXACIN AND DEXAMETHASONE 3; 1 MG/ML; MG/ML
4 SUSPENSION/ DROPS AURICULAR (OTIC) 2 TIMES DAILY
Qty: 2 ML | Refills: 0 | Status: SHIPPED | OUTPATIENT
Start: 2021-12-11 | End: 2021-12-14

## 2021-12-11 NOTE — TELEPHONE ENCOUNTER
Pt reporting worsening ear pain today.  He is taking antibiotics for a R ear infection.      Pt denies a fever, cough, SOB.     Triage disposition:  See a provider within 24 hours, in-person advised.  Patient verbalized understanding and had no further questions.      COVID 19 Nurse Triage Plan/Patient Instructions    Please be aware that novel coronavirus (COVID-19) may be circulating in the community. If you develop symptoms such as fever, cough, or SOB or if you have concerns about the presence of another infection including coronavirus (COVID-19), please contact your health care provider or visit [email or web address].     Disposition/Instructions    In-Person Visit with provider recommended. Reference Visit Selection Guide.    Thank you for taking steps to prevent the spread of this virus.  o Limit your contact with others.  o Wear a simple mask to cover your cough.  o Wash your hands well and often.    Resources    [facility]: About COVID-19: [email or web address]    CDC: What to Do If You're Sick: www.cdc.gov/coronavirus/2019-ncov/about/steps-when-sick.html    CDC: Ending Home Isolation: www.cdc.gov/coronavirus/2019-ncov/hcp/disposition-in-home-patients.html     CDC: Caring for Someone: www.cdc.gov/coronavirus/2019-ncov/if-you-are-sick/care-for-someone.html     [facility]: Interim Guidance for Hospital Discharge to Home: www.health.[facility].[place]/diseases/coronavirus/hcp/hospdischarge.pdf    [facility] clinical trials (COVID-19 research studies): clinicalaffairs.[facility]/n-clinical-trials     Below are the COVID-19 hotlines at the [place] Department of Health ([facility]). Interpreters are available.   o For health questions: Call 992-129-3696 or 1-805.820.7082 (7 a.m. to 7 p.m.)  o For questions about schools and childcare: Call 594-277-3270 or 1-923.331.3041 (7 a.m. to 7 p.m.)                 Saadia Bunn RN  [facility] Nurse Advisor                Reason for  Disposition    [1] Recently diagnosed with otitis media AND [2] currently on oral antibiotics    [1] Taking antibiotic > 72 hours (3 days) and [2] pain persists or recurs    Additional Information    Negative: [1] Stiff neck (unable to touch chin to chest) AND [2] fever    Negative: [1] Bony area of skull behind the ear is pink or swollen AND [2] fever    Negative: Fever > 104 F (40 C)    Negative: Patient sounds very sick or weak to the triager    Negative: [1] SEVERE pain and [2] not improved 2 hours after analgesic medication (e.g., ibuprofen or acetaminophen)    Negative: Walking is very unsteady or dizziness    Negative: [1] Vomited AND [2] 3 or more times    Protocols used: EARACHE-A-AH, EAR - OTITIS MEDIA FOLLOW-UP CALL-A-AH

## 2021-12-11 NOTE — PATIENT INSTRUCTIONS
Patient Education     Neck Spasm   A spasm of the neck muscles can happen after a sudden awkward neck movement. Sleeping with your neck in a crooked position can also cause spasm. Some people respond to emotional stress by tensing the muscles of their neck, shoulders, and upper back. If neck spasm lasts long enough, it can cause a headache.  The treatment described below will usually help the pain to go away in 5 to 7 days. Pain that continues may need further evaluation or other types of treatment such as physical therapy.  Home care    Rest and relax the muscles. Use a comfortable pillow that supports the head and keeps the spine in a neutral position. The position of the head should not be tilted forward or backward. A rolled up towel may help for a custom fit.    Some people find relief with heat. Heat can be applied with either a warm shower or bath or a moist towel heated in the microwave and massage. Others prefer cold packs. You can make an ice pack by filling a plastic bag that seals at the top with ice cubes or crushed ice and then wrapping it with a thin towel. Try both and use the method that feels best for 15 to 20 minutes, several times a day.    Whether using ice or heat, be careful that you don't injure your skin. Never put ice directly on the skin. Always wrap the ice in a towel or other type of cloth. This is very important, especially in people with poor skin sensation.    Try to reduce your stress level. Emotional stress can lead to neck muscle tension and get in the way of or delay the healing process.    You may use over-the-counter pain medicine to control pain, unless another medicine was prescribed. If you have chronic liver or kidney disease or ever had a stomach ulcer or gastrointestinal bleeding, talk with your healthcare provider before using these medicines.    Follow-up care  Follow up with your healthcare provider if your symptoms don't show signs of improvement after one week.  Physical therapy or further tests may be needed.  If X-rays, CT scans, or MRI scans were taken, you will be told of any new findings that may affect your care.  Call 911  Call 911 if you have:    Sudden weakness or numbness in one or both arms or legs    Neck swelling, trouble with or painful swallowing    Trouble breathing    Chest pain  When to seek medical advice  Call your healthcare provider right away if any of these occur:    Pain becomes worse or spreads into one or both arms or legs    Increasing headache with nausea or vomiting    Fever of 100.4 F (38 C) or higher, or as directed by your healthcare provider    Rodolfo Grant last reviewed this educational content on 5/1/2018 2000-2021 [employer]. All rights reserved. This information is not intended as a substitute for professional medical care. Always follow your healthcare professional's instructions.           Patient Education     Neck Sprain or Strain  A sudden force that causes turning or bending of the neck can cause sprain or strain. An example would be the force from a car accident. This can stretch or tear muscles called a strain. It can also stretch or tear ligaments called a sprain. Either of these can cause neck pain. Sometimes neck pain occurs after a simple awkward movement. In either case, muscle spasm is commonly present and contributes to the pain.   Unless you had a forceful physical injury (for example, a car accident or fall), X-rays are often not ordered for the initial evaluation of neck pain. If pain continues and does not respond to medical treatment, X-rays and other tests may be done later.  Home care    You may feel more soreness and spasm the first few days after the injury. Rest until symptoms start to improve.    When lying down, use a comfortable pillow or a rolled towel that supports the head and keeps the spine in a neutral position. The position of the head should not be tilted forward or  backward.    Apply an ice pack over the injured area for 15 to 20 minutes every 3 to 6 hours. Do this for the first 24 to 48 hours. You can make an ice pack by filling a plastic bag that seals at the top with ice cubes and then wrapping it with a thin towel. After 48 hours, apply heat (warm shower or warm bath) for 15 to 20 minutes several times a day, or alternate ice and heat.    You may use over-the-counter pain medicine to control pain, unless another pain medicine was prescribed. If you have chronic liver or kidney disease or ever had a stomach ulcer or gastrointestinal bleeding, talk with your healthcare provider before using these medicines.    If a soft cervical collar was prescribed, only ear it for periods of increased pain. It should not be worn for more than 3 hours a day, or for longer than 1 to 2 weeks.  Follow-up care  Follow up with your healthcare provider, or as directed. Physical therapy may be needed.  Sometimes fractures don t show up on the first X-ray. Bruises and sprains can sometimes hurt as much as a fracture. These injuries can take time to heal completely. If your symptoms don t improve or they get worse, talk with your healthcare provider. You may need a repeat X-ray or other tests. If X-rays were taken, you will be told of any new findings that may affect your care.  Call 911  Call 911 if you have:    Neck swelling, difficulty or painful swallowing    Trouble breathing    Chest pain  When to seek medical advice  Call your healthcare provider right away if any of these occur:    Pain becomes worse or spreads into your arms or legs    Weakness or numbness in one or both arms or legs  [employer] last reviewed this educational content on 5/1/2018 2000-2021 [employer]. All rights reserved. This information is not intended as a substitute for professional medical care. Always follow your healthcare professional's instructions.           Patient Education     Impacted Earwax      Inner ear structures including ear canal and eardrum.   Impacted earwax is a buildup of the natural wax in the ear. Impacted earwax is very common. It can cause symptoms such as hearing loss. It can also make it hard for a healthcare provider to check your ear.   Understanding earwax  Tiny glands in your ear make substances that combine with dead skin cells to form earwax. Earwax helps protect your ear canal from water, dirt, infection, and injury. Over time, earwax travels from the inner part of your ear canal to the entrance of the canal. Then it falls away naturally. But in some cases, it can t travel to the entrance of the canal. This may be because of a health condition or objects put in the ear. With age, earwax tends to become harder and less fluid. Older adults are more likely to have problems with earwax buildup.   What causes impacted earwax?  Earwax can build up because of many health conditions. Some cause a physical blockage. Others cause too much earwax to be made. Health conditions that can cause earwax buildup include:     Bony blockage in the ear (osteoma or exostoses)    Infections, such as an outer ear infection (external otitis)    Skin disease, such as eczema    Autoimmune diseases, such as lupus    A narrowed ear canal from birth, chronic inflammation, or injury    Too much earwax because of injury    Too much earwax because of  water in the ear canal  Putting objects in the ear again and again can also cause impacted earwax. For example, putting cotton swabs in the ear may push the wax deeper into the ear. Over time, this may cause blockage. Hearing aids, swimming plugs, and swim molds can also cause this problem when used again and again.   In some cases, the cause of impacted earwax is not known.  Symptoms of impacted earwax  Excess earwax often does not cause any symptoms, unless there is a large amount of buildup. Then it may cause symptoms such as:     Hearing loss    Earache    Sense  of ear fullness    Itching in the ear    Odor from the ear    Ear drainage    Dizziness    Ringing in the ears    Cough  Treatment for impacted earwax  If you don t have symptoms, you may not need treatment. Often the earwax goes away on its own with time. If you have symptoms, you may have 1 or more treatments such as:     Ear drops to soften the earwax. This helps it leave the ear over time.    Rinsing the ear canal with water. This is done in a healthcare provider s office.    Removing the earwax with small tools. This is also done in a provider s office.  In rare cases, some treatments for earwax removal may cause complications such as:    Outer ear infection    Earache    Short-term hearing loss    Dizziness    Water trapped in the ear canal    Hole in the eardrum    Ringing in the ears    Bleeding from the ear  Talk with your healthcare provider about which risks apply most to you.  Healthcare providers don't advise using ear candles or ear vacuum kits. These methods are not shown to work and may cause problems.   Preventing impacted earwax  You may not be able to prevent impacted earwax if you have a health condition that causes it, such as eczema. In other cases, you may be able to prevent earwax buildup by:     Using ear drops once a week    Having a regular ear cleaning about every 6 months    Not using cotton swabs in the ear  When to call the healthcare provider  Call your healthcare provider right away if you have:     Symptoms of impacted earwax    Severe symptoms after earwax removal, such as bleeding or severe ear pain    Rudy last reviewed this educational content on 9/1/2019 2000-2021 [employer]. All rights reserved. This information is not intended as a substitute for professional medical care. Always follow your healthcare professional's instructions.           Patient Education     External Ear Infection (Adult)    External otitis (also called  swimmer s ear ) is an  infection in the ear canal. It's often caused by bacteria or fungus. It can occur a few days after water gets trapped in the ear canal (from swimming or bathing). It can also occur after cleaning too deeply in the ear canal with a cotton swab or other object. Sometimes, hair care products get into the ear canal and cause this problem.   Symptoms can include pain, fever, itching, redness, drainage, or swelling of the ear canal. Temporary hearing loss may also occur.   Home care    Don't try to clean the ear canal. This can push pus and bacteria deeper into the canal.    Use prescribed ear drops as directed. These help reduce swelling and fight the infection. If an ear wick was placed in the ear canal, apply drops right onto the end of the wick. The wick will draw the medicine into the ear canal even if it's swollen closed.    A cotton ball may be loosely placed in the outer ear to absorb any drainage.    You may use over-the-counter medicines to control pain as directed by the healthcare provider, unless another medicine was prescribed. Talk with your provider before using these medicines if you have chronic liver or kidney disease or ever had a stomach ulcer or digestive tract bleeding.    Don't allow water to get into your ear when bathing. Also don't swim until the infection has cleared.    Prevention    Keep your ears dry. This helps lower the risk of infection. Dry your ears with a towel or hair dryer after getting wet. Also, use ear plugs when swimming.    Don't stick any objects in the ear to remove wax.    Talk with your provider about using ear drops to prevent swimmer's ear in case you feel water trapped in your ear canal. You can get these drops over the counter at most drugstores. They work by removing water from the ear canal.    Follow-up care  Follow up with your healthcare provider in 1 week, or as advised.   When to seek medical advice  Call your healthcare provider right away if any of these occur:      Ear pain becomes worse or doesn t improve after 3 days of treatment    Redness or swelling of the outer ear occurs or gets worse    Headache    Fever of 100.4 F (38 C) or higher, or as directed by your healthcare provider  Call 911  Call 911 or get immediate medical care if any of the following occur:     Seizure    Unusual drowsiness or confusion    Unusual painful or stiff neck    Rudy last reviewed this educational content on 8/1/2020 2000-2021 [employer]. All rights reserved. This information is not intended as a substitute for professional medical care. Always follow your healthcare professional's instructions.

## 2021-12-12 NOTE — PROGRESS NOTES
SUBJECTIVE:  Robby Yanez, a 80 year old male scheduled an appointment to discuss the following issues:     Bilateral impacted cerumen  Trapezius muscle spasm  Strain of neck muscle, initial encounter  Acute otitis externa of right ear, unspecified type    Medical, social, surgical, and family histories reviewed.     Otalgia (right side, affecting face and eye and the back of the back head on the right side.)    Last week patient was given antibiotics for his ear infection. He presents today stating that both his ear pain has not improved. He denies any discharge from his ears; his hearing is muffled.  In addition, he is also complaining of right neck and right back muscle pain. He does a lot of reading on his recliner, however not aware of neck injuries; but thought he might have strained his neck. No paresthesia or saddle anesthesia.    ROS:  See HPI.  No nausea/vomiting.  No fever/chills.  No chest pain/SOB.  No BM/urine problems.  No dizziness or syncope.      OBJECTIVE:  BP (!) 157/78   Pulse 60   Temp 97.9  F (36.6  C) (Tympanic)   SpO2 97%   EXAM:  GENERAL APPEARANCE: alert and mild distress; afebrile, no cyanosis or accessory muscle use, mild hypertension, moist mucous membrane, no meningeal signs, GCS 15  EYES: Eyes grossly normal to inspection, PERRL and conjunctivae and sclerae normal  HENT: Bilateral ear canals impacted with hard brown wax and TM's not visible bilaterally;  nose and mouth without ulcers or lesions; no sinus tenderness  NECK: no adenopathy, no asymmetry, masses, or scars and thyroid normal to palpation  RESP: lungs clear to auscultation - no rales, rhonchi or wheezes  CV: regular rates and rhythm, normal S1 S2, no S3 or S4 and no murmur, click or rub  LYMPHATICS: no cervical adenopathy  ABDOMEN: soft, nontender, without hepatosplenomegaly or masses and bowel sounds normal  MS: extremities normal- no gross deformities noted; No C-T-L-S spine midline tenderness or crepitus; some  spasm of the right sternocleidomastoid muscles and right upper trapezius muscles insertion; no redness or swelling or bruising; range of motion of the C-spine mildly reduced but still within normal limits; normal bilateral shoulders examination; neurovascularly intact bilateral upper and lower extremities  SKIN: no suspicious lesions or rashes  NEURO: Normal strength and tone, mentation intact and speech normal    ASSESSMENT/PLAN:  (H61.23) Bilateral impacted cerumen  (primary encounter diagnosis)  Comment: Bilateral ear irrigation removed significant amount of wax; right ear residual wax curetted---noted inflamed ear canal right ear; but normal left ear canal and bilateral tympanic membranes  Plan: REMOVE IMPACTED CERUMEN         (M62.838) Trapezius muscle spasm  Plan: diclofenac (VOLTAREN) 1 % topical gel,         methocarbamol (ROBAXIN) 750 MG tablet      (S16.1XXA) Strain of neck muscle, initial encounter  Plan: diclofenac (VOLTAREN) 1 % topical gel,         methocarbamol (ROBAXIN) 750 MG tablet      (H60.501) Acute otitis externa of right ear, unspecified type  Plan: ciprofloxacin-dexamethasone (CIPRODEX) 0.3-0.1         % otic suspension    Care instructions given.  Pt to f/up PCP within 1 week if no improvement or worsening.  Warning signs and symptoms explained.

## 2021-12-14 ENCOUNTER — OFFICE VISIT (OUTPATIENT)
Dept: INTERNAL MEDICINE | Facility: CLINIC | Age: 81
End: 2021-12-14
Payer: MEDICARE

## 2021-12-14 VITALS
SYSTOLIC BLOOD PRESSURE: 126 MMHG | TEMPERATURE: 97.9 F | BODY MASS INDEX: 25.06 KG/M2 | HEART RATE: 80 BPM | OXYGEN SATURATION: 99 % | DIASTOLIC BLOOD PRESSURE: 66 MMHG | RESPIRATION RATE: 16 BRPM | WEIGHT: 160 LBS

## 2021-12-14 DIAGNOSIS — K21.00 GASTROESOPHAGEAL REFLUX DISEASE WITH ESOPHAGITIS WITHOUT HEMORRHAGE: ICD-10-CM

## 2021-12-14 DIAGNOSIS — M17.10 ARTHRITIS OF KNEE: ICD-10-CM

## 2021-12-14 DIAGNOSIS — J30.2 SEASONAL ALLERGIC RHINITIS, UNSPECIFIED TRIGGER: Primary | ICD-10-CM

## 2021-12-14 PROCEDURE — 99214 OFFICE O/P EST MOD 30 MIN: CPT | Performed by: INTERNAL MEDICINE

## 2021-12-14 RX ORDER — LORATADINE 10 MG/1
10 TABLET ORAL DAILY
Qty: 30 TABLET | Refills: 0 | Status: SHIPPED | OUTPATIENT
Start: 2021-12-14

## 2021-12-14 RX ORDER — OMEPRAZOLE 20 MG/1
20 TABLET, DELAYED RELEASE ORAL DAILY
Qty: 90 TABLET | Refills: 1 | Status: SHIPPED | OUTPATIENT
Start: 2021-12-14

## 2021-12-14 NOTE — PROGRESS NOTES
"Carlyn Bustamante is a 80 year old who presents for the following health issues     HPI     Chief Complaint   Patient presents with     Establish Care     Sinus Problem        EMR reviewed including:             Complaint, History of Chief Complaint, Corresponding Review of Systems, and Complaint Specific Physical Examination.    #1   Occasional difficulty swallowing due to posterior nasal drainage.  Recently concluded course of antibiotics for otitis and pharyngitis.  Food goes down smoothly with a small drink of water.  No evidence of esophageal stricture.  Complains of posterior nasal drainage which is generally clear now.  Denies fevers or chills.  Has a history of gastroesophageal reflux which is exacerbated by posterior nasal drainage.  Has been using Mexican sourced Prilosec.  States that this helps considerably.  Denies melena or hematochezia.        Exam:   ENT: Pharynx is non-erythemous, moderate/clear PND, no significant nasal obstruction, TM's not red or retracted, hearing decreased chronically bilaterally. No carotid bruits are heard. No JVD seen. Thyroid is not nodular or enlarged.   LUNGS: clear bilaterally, airflow is brisk, no intercostal retraction or stridor is noted. No coughing is noted during visit.   HEART:  regular without rubs, clicks, gallops, or murmurs. PMI is nondisplaced. Upstrokes are brisk. S1,S2 are heard.      #2   Bilateral knee pain  History of previous arthroscopic surgery bilaterally.  Feels as if sometimes they are going to \"give out on him\" has never fallen.  Denies erythema or swelling.        Exam:   MS: Moderate crepitance is noted in the knees. No deformity is present. Muscle strength is appropriate and equal bilaterally. No acute joint erythema or swelling is present.  No signs of redness or overlying infection noted.          Patient has been interviewed, applicable history and applied review of systems have been performed.    Vital Signs:   /66   Pulse 80  "  Temp 97.9  F (36.6  C) (Temporal)   Resp 16   Wt 72.6 kg (160 lb)   SpO2 99%   BMI 25.06 kg/m        Decision Making    Problem and Complexity     1. Seasonal allergic rhinitis, unspecified trigger  We will start Claritin 10 mg daily is used as needed.  This will also hopefully help offset any residual eustachian tube dysfunction and recurrence of his otitis.  - loratadine (CLARITIN) 10 MG tablet; Take 1 tablet (10 mg) by mouth daily  Dispense: 30 tablet; Refill: 0    2. Gastroesophageal reflux disease with esophagitis without hemorrhage  Continue Prilosec.  If it does not control symptoms, would recommend EGD.  - omeprazole (PRILOSEC OTC) 20 MG EC tablet; Take 1 tablet (20 mg) by mouth daily  Dispense: 90 tablet; Refill: 1    3. Arthritis of knee  Avoid oral NSAIDs due to gastric concerns.  We will send patient written prescription so he can compare prices prior to purchasing the medication.- diclofenac (VOLTAREN) 1 % topical gel; Apply 4 g topically 4 times daily  Dispense: 350 g; Refill: 3                                FOLLOW UP   I have asked the patient to make an appointment for followup with me in 1 month or as needed        I have carefully explained the diagnosis and treatment options to the patient.  The patient has displayed an understanding of the above, and all subsequent questions were answered.      DO CHARBEL Castano    Portions of this note were produced using [employer]  Although every attempt at real-time proof reading has been made, occasional grammar/syntax errors may have been missed.

## 2021-12-15 ENCOUNTER — TELEPHONE (OUTPATIENT)
Dept: INTERNAL MEDICINE | Facility: CLINIC | Age: 81
End: 2021-12-15
Payer: MEDICARE

## 2021-12-15 NOTE — TELEPHONE ENCOUNTER
Patient states he was seen in clinic yesterday and thought he was getting an antibiotic for sinus infection. Please advise     Ph. [phone]

## 2021-12-16 NOTE — TELEPHONE ENCOUNTER
Patient had recently concluded antibiotic.  Physical findings were consistent with allergic rhinitis and reflux  Medications sent to pharmacy for Prilosec and Claritin.    No antibiotic indicated or ordered at the time of visit.        Key

## (undated) DEVICE — TRAY PROCEDURE SUPPORT PAIN MANAGEMENT 332114

## (undated) DEVICE — GLOVE PROTEXIS W/NEU-THERA 7.5  2D73TE75

## (undated) DEVICE — NDL SPINAL 25GA 4.69" QUINCKE 405234

## (undated) DEVICE — SYR 05ML LL W/O NDL

## (undated) DEVICE — PREP CHLORAPREP 26ML TINTED ORANGE  260815

## (undated) DEVICE — TUBING IV EXTENSION SET 34"

## (undated) DEVICE — SYR 10ML LL W/O NDL